# Patient Record
Sex: FEMALE | Race: ASIAN | URBAN - METROPOLITAN AREA
[De-identification: names, ages, dates, MRNs, and addresses within clinical notes are randomized per-mention and may not be internally consistent; named-entity substitution may affect disease eponyms.]

---

## 2018-03-23 ENCOUNTER — OUTPATIENT (OUTPATIENT)
Dept: OUTPATIENT SERVICES | Facility: HOSPITAL | Age: 78
LOS: 1 days | Discharge: HOME | End: 2018-03-23

## 2018-03-23 DIAGNOSIS — M79.672 PAIN IN LEFT FOOT: ICD-10-CM

## 2021-12-23 ENCOUNTER — OUTPATIENT (OUTPATIENT)
Dept: OUTPATIENT SERVICES | Facility: HOSPITAL | Age: 81
LOS: 1 days | Discharge: HOME | End: 2021-12-23

## 2021-12-23 VITALS
DIASTOLIC BLOOD PRESSURE: 77 MMHG | TEMPERATURE: 97 F | OXYGEN SATURATION: 98 % | RESPIRATION RATE: 17 BRPM | HEART RATE: 77 BPM | SYSTOLIC BLOOD PRESSURE: 171 MMHG | WEIGHT: 175.05 LBS | HEIGHT: 61 IN

## 2021-12-23 VITALS — DIASTOLIC BLOOD PRESSURE: 60 MMHG | SYSTOLIC BLOOD PRESSURE: 139 MMHG | RESPIRATION RATE: 17 BRPM | HEART RATE: 65 BPM

## 2021-12-23 DIAGNOSIS — Z98.890 OTHER SPECIFIED POSTPROCEDURAL STATES: Chronic | ICD-10-CM

## 2021-12-23 DIAGNOSIS — Z90.710 ACQUIRED ABSENCE OF BOTH CERVIX AND UTERUS: Chronic | ICD-10-CM

## 2021-12-23 NOTE — ASU PATIENT PROFILE, ADULT - FALL HARM RISK - UNIVERSAL INTERVENTIONS
Bed in lowest position, wheels locked, appropriate side rails in place/Call bell, personal items and telephone in reach/Instruct patient to call for assistance before getting out of bed or chair/Non-slip footwear when patient is out of bed/[place] to call system/Physically safe environment - no spills, clutter or unnecessary equipment/Purposeful Proactive Rounding/Room/bathroom lighting operational, light cord in reach

## 2021-12-23 NOTE — ASU PREOP CHECKLIST - TEMPERATURE IN CELSIUS (DEGREES C)
Brenda Caban is doing great. Keep up the good work! Here are just a few things to remember:    1. Discipline with positive distractions and/or brief time-outs. No hitting or spanking. Make sure to praise for good behavior.  2. Have and keep a nightly bedtime routine. Brush teeth every night before bed as well as in the morning using a smear of fluoride-containing toothpaste on a soft toothbrush.  3. Continue using a rear-facing car seat. Never put the car seat in the front seat.  4. Transition from formula/breast milk to whole milk. Encourage self-feeding. Provide 3 meals and 2-3 nutritious snacks a day.  5. Keep the home safe - use glover for stairs, lock up cleaning products and medicine, keep home water temperature <120 F, and keep small objects away from the baby.    Please make an appointment with me for the 15-month visit. Feel free to call us before your next appointment if you have any questions or concerns.    
36

## 2021-12-23 NOTE — ASU PREOP CHECKLIST - HAND OFF

## 2021-12-28 DIAGNOSIS — H25.89 OTHER AGE-RELATED CATARACT: ICD-10-CM

## 2021-12-28 DIAGNOSIS — I10 ESSENTIAL (PRIMARY) HYPERTENSION: ICD-10-CM

## 2021-12-28 DIAGNOSIS — Z88.0 ALLERGY STATUS TO PENICILLIN: ICD-10-CM

## 2021-12-28 DIAGNOSIS — Z88.8 ALLERGY STATUS TO OTHER DRUGS, MEDICAMENTS AND BIOLOGICAL SUBSTANCES STATUS: ICD-10-CM

## 2021-12-28 DIAGNOSIS — Z88.1 ALLERGY STATUS TO OTHER ANTIBIOTIC AGENTS STATUS: ICD-10-CM

## 2021-12-30 PROBLEM — I10 ESSENTIAL (PRIMARY) HYPERTENSION: Chronic | Status: ACTIVE | Noted: 2021-12-23

## 2022-01-06 ENCOUNTER — OUTPATIENT (OUTPATIENT)
Dept: OUTPATIENT SERVICES | Facility: HOSPITAL | Age: 82
LOS: 1 days | Discharge: HOME | End: 2022-01-06

## 2022-01-06 VITALS
HEART RATE: 68 BPM | WEIGHT: 175.05 LBS | DIASTOLIC BLOOD PRESSURE: 66 MMHG | RESPIRATION RATE: 17 BRPM | SYSTOLIC BLOOD PRESSURE: 154 MMHG | HEIGHT: 62 IN | OXYGEN SATURATION: 99 % | TEMPERATURE: 98 F

## 2022-01-06 VITALS — SYSTOLIC BLOOD PRESSURE: 139 MMHG | HEART RATE: 59 BPM | RESPIRATION RATE: 16 BRPM | DIASTOLIC BLOOD PRESSURE: 48 MMHG

## 2022-01-06 DIAGNOSIS — Z90.710 ACQUIRED ABSENCE OF BOTH CERVIX AND UTERUS: Chronic | ICD-10-CM

## 2022-01-06 DIAGNOSIS — Z90.89 ACQUIRED ABSENCE OF OTHER ORGANS: Chronic | ICD-10-CM

## 2022-01-06 DIAGNOSIS — Z98.890 OTHER SPECIFIED POSTPROCEDURAL STATES: Chronic | ICD-10-CM

## 2022-01-06 DIAGNOSIS — Z96.1 PRESENCE OF INTRAOCULAR LENS: Chronic | ICD-10-CM

## 2022-01-06 NOTE — ASU PATIENT PROFILE, ADULT - FALL HARM RISK - HARM RISK INTERVENTIONS

## 2022-01-06 NOTE — ASU PATIENT PROFILE, ADULT - NSICDXPASTSURGICALHX_GEN_ALL_CORE_FT
PAST SURGICAL HISTORY:  History of intraocular lens implant RIGHT    S/P bladder repair     S/P colonoscopy 2005    S/P hysterectomy     S/P tonsillectomy and adenoidectomy

## 2022-01-10 DIAGNOSIS — H26.8 OTHER SPECIFIED CATARACT: ICD-10-CM

## 2022-01-10 DIAGNOSIS — Z88.1 ALLERGY STATUS TO OTHER ANTIBIOTIC AGENTS STATUS: ICD-10-CM

## 2022-01-10 DIAGNOSIS — Z88.0 ALLERGY STATUS TO PENICILLIN: ICD-10-CM

## 2022-06-13 PROBLEM — E03.9 HYPOTHYROIDISM, UNSPECIFIED: Chronic | Status: ACTIVE | Noted: 2022-01-06

## 2022-06-13 PROBLEM — H26.9 UNSPECIFIED CATARACT: Chronic | Status: ACTIVE | Noted: 2022-01-06

## 2022-08-23 ENCOUNTER — APPOINTMENT (OUTPATIENT)
Dept: ORTHOPEDIC SURGERY | Facility: CLINIC | Age: 82
End: 2022-08-23

## 2022-08-23 DIAGNOSIS — M17.0 BILATERAL PRIMARY OSTEOARTHRITIS OF KNEE: ICD-10-CM

## 2022-08-23 PROBLEM — Z00.00 ENCOUNTER FOR PREVENTIVE HEALTH EXAMINATION: Status: ACTIVE | Noted: 2022-08-23

## 2022-08-23 PROCEDURE — 73560 X-RAY EXAM OF KNEE 1 OR 2: CPT | Mod: 50

## 2022-08-23 PROCEDURE — 99214 OFFICE O/P EST MOD 30 MIN: CPT

## 2022-08-23 NOTE — HISTORY OF PRESENT ILLNESS
[de-identified] : R KNEE PAIN WILL SCHEDULE FOR R TKA\par \par History: RIGHT KNEE PAIN, INJECTIONS NO LONGER RELIEVING SYMPTOMS FROM OA\par Patient has failed a course of nonoperative management including rest, activity modification, weight control, stretching program, therapeutic exercises, anti-inflammatory medication and angalgesics.\par \par Medical History: Past Medical History is unchanged, all medical issues and medications are stable.\par \par Review of systems is negative for fevers, chills, chest pain, shortness of breath, unexplained weight fluctuations, GI or  symptoms.\par \par Smoking history and social habits are unchanged.\par \par \par Exam:\par RIGHT KNEE: There is an effusion, clinically varus alignment, tenderness around the joint line, and a positive patella grind test.  ROM is 3-110 degrees and there is significant guarding throughout the arc of motion.  Mild quadriceps atrophy. Varus thrust is present with stress, no gross is instability noted.\par \par Imaging:\par Imaging:\par X-rays were taken in the office today.  \par AP and Lateral views were obtained of the knees.\par X-rays are negative for acute bone or soft tissue trauma.\par SEVERE ARTHRITIS NOTED IN BILATERAL KNEES\par \par Impression: BILATERAL KNEE OA WITH SYMPTOMS MORE SEVERE ON THE RIGHT THAN LEFT\par \par Plan: We discussed the surgery, including a description of the surgery in layman's terms, preoperative evaluation, the hospital stay, anesthesia, and expected outcome.  \par Models equivalent to the actual knee replacement prosthesis were used to demonstrate the magnitude and scope of the surgery.  Various modes of implant fixation including cement and biologic fixation were described.  The patient shared in the decision making and agreed to the use of implants.\par \par Additionally surgical risks were discussed. The patient has a good understanding of the inherent risks of surgery which include bleeding, pain, and infection, blood clots, and any medical issues that may arise in the perioperative period.  Additionally, we discussed risks uniquely pertinent to knee replacement surgery, including arthrofibrosis, instability, loosening, numbness around the incision, nerve injury, and possible need for revision surgery should the replacement not function optimally.  All questions were answered and the patient verbalized understanding.  I encouraged the patient to contact me should any further questions or issues arise.\par \par

## 2022-09-27 ENCOUNTER — RESULT REVIEW (OUTPATIENT)
Age: 82
End: 2022-09-27

## 2022-09-27 ENCOUNTER — OUTPATIENT (OUTPATIENT)
Dept: OUTPATIENT SERVICES | Facility: HOSPITAL | Age: 82
LOS: 1 days | Discharge: HOME | End: 2022-09-27

## 2022-09-27 VITALS
WEIGHT: 189.6 LBS | DIASTOLIC BLOOD PRESSURE: 72 MMHG | HEIGHT: 61 IN | TEMPERATURE: 97 F | OXYGEN SATURATION: 100 % | SYSTOLIC BLOOD PRESSURE: 168 MMHG | RESPIRATION RATE: 18 BRPM | HEART RATE: 64 BPM

## 2022-09-27 DIAGNOSIS — Z90.89 ACQUIRED ABSENCE OF OTHER ORGANS: Chronic | ICD-10-CM

## 2022-09-27 DIAGNOSIS — Z98.890 OTHER SPECIFIED POSTPROCEDURAL STATES: Chronic | ICD-10-CM

## 2022-09-27 DIAGNOSIS — J34.2 DEVIATED NASAL SEPTUM: Chronic | ICD-10-CM

## 2022-09-27 DIAGNOSIS — M17.11 UNILATERAL PRIMARY OSTEOARTHRITIS, RIGHT KNEE: ICD-10-CM

## 2022-09-27 DIAGNOSIS — Z01.818 ENCOUNTER FOR OTHER PREPROCEDURAL EXAMINATION: ICD-10-CM

## 2022-09-27 DIAGNOSIS — Z96.1 PRESENCE OF INTRAOCULAR LENS: Chronic | ICD-10-CM

## 2022-09-27 DIAGNOSIS — Z90.710 ACQUIRED ABSENCE OF BOTH CERVIX AND UTERUS: Chronic | ICD-10-CM

## 2022-09-27 LAB
A1C WITH ESTIMATED AVERAGE GLUCOSE RESULT: 5.9 % — HIGH (ref 4–5.6)
ALBUMIN SERPL ELPH-MCNC: 4.5 G/DL — SIGNIFICANT CHANGE UP (ref 3.5–5.2)
ALP SERPL-CCNC: 90 U/L — SIGNIFICANT CHANGE UP (ref 30–115)
ALT FLD-CCNC: 28 U/L — SIGNIFICANT CHANGE UP (ref 0–41)
ANION GAP SERPL CALC-SCNC: 13 MMOL/L — SIGNIFICANT CHANGE UP (ref 7–14)
APTT BLD: 28.9 SEC — SIGNIFICANT CHANGE UP (ref 27–39.2)
AST SERPL-CCNC: 24 U/L — SIGNIFICANT CHANGE UP (ref 0–41)
BASOPHILS # BLD AUTO: 0.04 K/UL — SIGNIFICANT CHANGE UP (ref 0–0.2)
BASOPHILS NFR BLD AUTO: 0.7 % — SIGNIFICANT CHANGE UP (ref 0–1)
BILIRUB SERPL-MCNC: 0.8 MG/DL — SIGNIFICANT CHANGE UP (ref 0.2–1.2)
BLD GP AB SCN SERPL QL: SIGNIFICANT CHANGE UP
BUN SERPL-MCNC: 26 MG/DL — HIGH (ref 10–20)
CALCIUM SERPL-MCNC: 9.9 MG/DL — SIGNIFICANT CHANGE UP (ref 8.4–10.5)
CHLORIDE SERPL-SCNC: 104 MMOL/L — SIGNIFICANT CHANGE UP (ref 98–110)
CO2 SERPL-SCNC: 25 MMOL/L — SIGNIFICANT CHANGE UP (ref 17–32)
CREAT SERPL-MCNC: 1 MG/DL — SIGNIFICANT CHANGE UP (ref 0.7–1.5)
EGFR: 57 ML/MIN/1.73M2 — LOW
EOSINOPHIL # BLD AUTO: 0.4 K/UL — SIGNIFICANT CHANGE UP (ref 0–0.7)
EOSINOPHIL NFR BLD AUTO: 6.7 % — SIGNIFICANT CHANGE UP (ref 0–8)
ESTIMATED AVERAGE GLUCOSE: 123 MG/DL — HIGH (ref 68–114)
GLUCOSE SERPL-MCNC: 95 MG/DL — SIGNIFICANT CHANGE UP (ref 70–99)
HCT VFR BLD CALC: 40.6 % — SIGNIFICANT CHANGE UP (ref 37–47)
HGB BLD-MCNC: 13 G/DL — SIGNIFICANT CHANGE UP (ref 12–16)
IMM GRANULOCYTES NFR BLD AUTO: 0.3 % — SIGNIFICANT CHANGE UP (ref 0.1–0.3)
INR BLD: 0.96 RATIO — SIGNIFICANT CHANGE UP (ref 0.65–1.3)
LYMPHOCYTES # BLD AUTO: 1.89 K/UL — SIGNIFICANT CHANGE UP (ref 1.2–3.4)
LYMPHOCYTES # BLD AUTO: 31.6 % — SIGNIFICANT CHANGE UP (ref 20.5–51.1)
MCHC RBC-ENTMCNC: 30.6 PG — SIGNIFICANT CHANGE UP (ref 27–31)
MCHC RBC-ENTMCNC: 32 G/DL — SIGNIFICANT CHANGE UP (ref 32–37)
MCV RBC AUTO: 95.5 FL — SIGNIFICANT CHANGE UP (ref 81–99)
MONOCYTES # BLD AUTO: 0.58 K/UL — SIGNIFICANT CHANGE UP (ref 0.1–0.6)
MONOCYTES NFR BLD AUTO: 9.7 % — HIGH (ref 1.7–9.3)
MRSA PCR RESULT.: NEGATIVE — SIGNIFICANT CHANGE UP
NEUTROPHILS # BLD AUTO: 3.06 K/UL — SIGNIFICANT CHANGE UP (ref 1.4–6.5)
NEUTROPHILS NFR BLD AUTO: 51 % — SIGNIFICANT CHANGE UP (ref 42.2–75.2)
NRBC # BLD: 0 /100 WBCS — SIGNIFICANT CHANGE UP (ref 0–0)
PLATELET # BLD AUTO: 176 K/UL — SIGNIFICANT CHANGE UP (ref 130–400)
POTASSIUM SERPL-MCNC: 4.3 MMOL/L — SIGNIFICANT CHANGE UP (ref 3.5–5)
POTASSIUM SERPL-SCNC: 4.3 MMOL/L — SIGNIFICANT CHANGE UP (ref 3.5–5)
PROT SERPL-MCNC: 6.6 G/DL — SIGNIFICANT CHANGE UP (ref 6–8)
PROTHROM AB SERPL-ACNC: 11.1 SEC — SIGNIFICANT CHANGE UP (ref 9.95–12.87)
RBC # BLD: 4.25 M/UL — SIGNIFICANT CHANGE UP (ref 4.2–5.4)
RBC # FLD: 13.3 % — SIGNIFICANT CHANGE UP (ref 11.5–14.5)
SODIUM SERPL-SCNC: 142 MMOL/L — SIGNIFICANT CHANGE UP (ref 135–146)
WBC # BLD: 5.99 K/UL — SIGNIFICANT CHANGE UP (ref 4.8–10.8)
WBC # FLD AUTO: 5.99 K/UL — SIGNIFICANT CHANGE UP (ref 4.8–10.8)

## 2022-09-27 PROCEDURE — 72170 X-RAY EXAM OF PELVIS: CPT | Mod: 26

## 2022-09-27 PROCEDURE — 93010 ELECTROCARDIOGRAM REPORT: CPT

## 2022-09-27 PROCEDURE — 71046 X-RAY EXAM CHEST 2 VIEWS: CPT | Mod: 26

## 2022-09-27 PROCEDURE — 73562 X-RAY EXAM OF KNEE 3: CPT | Mod: 26,RT

## 2022-09-27 RX ORDER — TERAZOSIN HYDROCHLORIDE 10 MG/1
0 CAPSULE ORAL
Qty: 0 | Refills: 0 | DISCHARGE

## 2022-09-27 NOTE — H&P PST ADULT - NSICDXPASTMEDICALHX_GEN_ALL_CORE_FT
PAST MEDICAL HISTORY:  Bilateral cataracts     [place] (hard of hearing)     Hypertension     Hypothyroidism     Obesity BMI 35    Overactive bladder

## 2022-09-27 NOTE — H&P PST ADULT - HISTORY OF PRESENT ILLNESS
Pt denies cp palp uri cough dysuria    ET 1 FOS - difficult  to assess regarding pain in knee- but admits to SOB with 1 FOS -   PATIENT DENIES WALKING 1 FLAT BLOCKue to pain but  PATIENT DENIES SOB WITH WALKING A SUPERMARKET OR STORE 1 HOUR-WALKING SLOWLY.DENIES SOB     PT denies any open wounds, drainage or rashes.   denies recent cough fever or infection. aware to self quaerantine preop. all instructions reviewed.    scheduled fotr 10/14 right TKR . hx r knee OA. right knee increasing pain x 5 years. right knee pain 10/10 while walking  pain decrease with rest.. increases with motion and movement. DENIES PAIN MEDS  hx jehovah witness.   will refuse transfusion but accept alternatives in emergency  follows dr stokes endocrine. denies any recent  dose changes with levothyroxine.     Patient verbalized understanding of instructions and was given the opportunity to ask questions and have them answered.    Patient denies any signs or symptoms of COVID 19 and denies contact with known positive individuals.  They have an appointment for repeat COVID testing pre-procedure and acknowledge its time and place.  They were instructed to quarantine pre-procedure, practice exposure control measures, continue to self-monitor and report any concerns to their proceduralist    Anesthesia Alert  yes--Difficult Airway  IV  NO--History of neck surgery or radiation  NO--Limited ROM of neck  NO--History of Malignant hyperthermia  NO--Personal or family history of Pseudocholinesterase deficiency.  YES--Prior Anesthesia Complication   n/v  NO--Latex Allergy  NO--Loose teeth  NO--History of Rheumatoid Arthritis  unknown --NAOMIE   admits to snoring- denies work up  NO--Bleeding risk  yes--Other_____  Jehovah witness-

## 2022-09-27 NOTE — H&P PST ADULT - NSICDXPASTSURGICALHX_GEN_ALL_CORE_FT
PAST SURGICAL HISTORY:  Deviated septum 1980s    History of intraocular lens implant RIGHT    S/P bladder repair     S/P colonoscopy 2005    S/P hysterectomy     S/P tonsillectomy and adenoidectomy

## 2022-10-11 ENCOUNTER — LABORATORY RESULT (OUTPATIENT)
Age: 82
End: 2022-10-11

## 2022-10-13 ENCOUNTER — FORM ENCOUNTER (OUTPATIENT)
Age: 82
End: 2022-10-13

## 2022-10-14 ENCOUNTER — TRANSCRIPTION ENCOUNTER (OUTPATIENT)
Age: 82
End: 2022-10-14

## 2022-10-14 ENCOUNTER — APPOINTMENT (OUTPATIENT)
Dept: ORTHOPEDIC SURGERY | Facility: HOSPITAL | Age: 82
End: 2022-10-14

## 2022-10-14 ENCOUNTER — RESULT REVIEW (OUTPATIENT)
Age: 82
End: 2022-10-14

## 2022-10-14 ENCOUNTER — INPATIENT (INPATIENT)
Facility: HOSPITAL | Age: 82
LOS: 0 days | Discharge: HOME HEALTH | End: 2022-10-15
Attending: ORTHOPAEDIC SURGERY | Admitting: ORTHOPAEDIC SURGERY

## 2022-10-14 VITALS
WEIGHT: 184.97 LBS | DIASTOLIC BLOOD PRESSURE: 68 MMHG | HEIGHT: 61 IN | SYSTOLIC BLOOD PRESSURE: 158 MMHG | RESPIRATION RATE: 17 BRPM | TEMPERATURE: 97 F | HEART RATE: 69 BPM | OXYGEN SATURATION: 99 %

## 2022-10-14 DIAGNOSIS — Z90.89 ACQUIRED ABSENCE OF OTHER ORGANS: Chronic | ICD-10-CM

## 2022-10-14 DIAGNOSIS — Z96.1 PRESENCE OF INTRAOCULAR LENS: Chronic | ICD-10-CM

## 2022-10-14 DIAGNOSIS — Z90.710 ACQUIRED ABSENCE OF BOTH CERVIX AND UTERUS: Chronic | ICD-10-CM

## 2022-10-14 DIAGNOSIS — J34.2 DEVIATED NASAL SEPTUM: Chronic | ICD-10-CM

## 2022-10-14 DIAGNOSIS — Z98.890 OTHER SPECIFIED POSTPROCEDURAL STATES: Chronic | ICD-10-CM

## 2022-10-14 DIAGNOSIS — Z98.49 CATARACT EXTRACTION STATUS, UNSPECIFIED EYE: Chronic | ICD-10-CM

## 2022-10-14 LAB
GLUCOSE BLDC GLUCOMTR-MCNC: 108 MG/DL — HIGH (ref 70–99)
GLUCOSE BLDC GLUCOMTR-MCNC: 89 MG/DL — SIGNIFICANT CHANGE UP (ref 70–99)

## 2022-10-14 PROCEDURE — 27447 TOTAL KNEE ARTHROPLASTY: CPT | Mod: RT

## 2022-10-14 PROCEDURE — 88305 TISSUE EXAM BY PATHOLOGIST: CPT | Mod: 26

## 2022-10-14 PROCEDURE — 88311 DECALCIFY TISSUE: CPT | Mod: 26

## 2022-10-14 PROCEDURE — 73560 X-RAY EXAM OF KNEE 1 OR 2: CPT | Mod: 26,RT

## 2022-10-14 RX ORDER — ACETAMINOPHEN 500 MG
1000 TABLET ORAL ONCE
Refills: 0 | Status: COMPLETED | OUTPATIENT
Start: 2022-10-14 | End: 2022-10-14

## 2022-10-14 RX ORDER — MAGNESIUM HYDROXIDE 400 MG/1
30 TABLET, CHEWABLE ORAL DAILY
Refills: 0 | Status: DISCONTINUED | OUTPATIENT
Start: 2022-10-14 | End: 2022-10-15

## 2022-10-14 RX ORDER — SENNA PLUS 8.6 MG/1
2 TABLET ORAL AT BEDTIME
Refills: 0 | Status: DISCONTINUED | OUTPATIENT
Start: 2022-10-14 | End: 2022-10-15

## 2022-10-14 RX ORDER — PANTOPRAZOLE SODIUM 20 MG/1
1 TABLET, DELAYED RELEASE ORAL
Qty: 30 | Refills: 0
Start: 2022-10-14 | End: 2022-11-12

## 2022-10-14 RX ORDER — ASPIRIN/CALCIUM CARB/MAGNESIUM 324 MG
81 TABLET ORAL EVERY 12 HOURS
Refills: 0 | Status: DISCONTINUED | OUTPATIENT
Start: 2022-10-14 | End: 2022-10-15

## 2022-10-14 RX ORDER — CEFAZOLIN SODIUM 1 G
2000 VIAL (EA) INJECTION EVERY 8 HOURS
Refills: 0 | Status: COMPLETED | OUTPATIENT
Start: 2022-10-14 | End: 2022-10-15

## 2022-10-14 RX ORDER — CHLORHEXIDINE GLUCONATE 213 G/1000ML
1 SOLUTION TOPICAL
Refills: 0 | Status: DISCONTINUED | OUTPATIENT
Start: 2022-10-14 | End: 2022-10-15

## 2022-10-14 RX ORDER — OXYCODONE HYDROCHLORIDE 5 MG/1
1 TABLET ORAL
Qty: 9 | Refills: 0
Start: 2022-10-14 | End: 2022-10-16

## 2022-10-14 RX ORDER — LEVOTHYROXINE SODIUM 125 MCG
75 TABLET ORAL DAILY
Refills: 0 | Status: DISCONTINUED | OUTPATIENT
Start: 2022-10-14 | End: 2022-10-15

## 2022-10-14 RX ORDER — DEXAMETHASONE 0.5 MG/5ML
2 ELIXIR ORAL ONCE
Refills: 0 | Status: COMPLETED | OUTPATIENT
Start: 2022-10-15 | End: 2022-10-15

## 2022-10-14 RX ORDER — SODIUM CHLORIDE 9 MG/ML
1000 INJECTION, SOLUTION INTRAVENOUS
Refills: 0 | Status: DISCONTINUED | OUTPATIENT
Start: 2022-10-14 | End: 2022-10-14

## 2022-10-14 RX ORDER — AMLODIPINE BESYLATE 2.5 MG/1
10 TABLET ORAL DAILY
Refills: 0 | Status: DISCONTINUED | OUTPATIENT
Start: 2022-10-14 | End: 2022-10-15

## 2022-10-14 RX ORDER — ONDANSETRON 8 MG/1
4 TABLET, FILM COATED ORAL EVERY 6 HOURS
Refills: 0 | Status: DISCONTINUED | OUTPATIENT
Start: 2022-10-14 | End: 2022-10-15

## 2022-10-14 RX ORDER — LEVOTHYROXINE SODIUM 125 MCG
1 TABLET ORAL
Qty: 0 | Refills: 0 | DISCHARGE

## 2022-10-14 RX ORDER — ASPIRIN/CALCIUM CARB/MAGNESIUM 324 MG
1 TABLET ORAL
Qty: 60 | Refills: 0
Start: 2022-10-14 | End: 2022-11-12

## 2022-10-14 RX ORDER — ACETAMINOPHEN 500 MG
650 TABLET ORAL EVERY 6 HOURS
Refills: 0 | Status: DISCONTINUED | OUTPATIENT
Start: 2022-10-14 | End: 2022-10-15

## 2022-10-14 RX ORDER — SENNA PLUS 8.6 MG/1
2 TABLET ORAL
Qty: 30 | Refills: 0
Start: 2022-10-14 | End: 2022-10-28

## 2022-10-14 RX ORDER — TRAMADOL HYDROCHLORIDE 50 MG/1
1 TABLET ORAL
Qty: 24 | Refills: 0
Start: 2022-10-14 | End: 2022-10-19

## 2022-10-14 RX ORDER — SODIUM CHLORIDE 9 MG/ML
1000 INJECTION INTRAMUSCULAR; INTRAVENOUS; SUBCUTANEOUS
Refills: 0 | Status: DISCONTINUED | OUTPATIENT
Start: 2022-10-14 | End: 2022-10-15

## 2022-10-14 RX ORDER — HYDROMORPHONE HYDROCHLORIDE 2 MG/ML
0.5 INJECTION INTRAMUSCULAR; INTRAVENOUS; SUBCUTANEOUS
Refills: 0 | Status: DISCONTINUED | OUTPATIENT
Start: 2022-10-14 | End: 2022-10-14

## 2022-10-14 RX ORDER — MORPHINE SULFATE 50 MG/1
2 CAPSULE, EXTENDED RELEASE ORAL
Refills: 0 | Status: DISCONTINUED | OUTPATIENT
Start: 2022-10-14 | End: 2022-10-14

## 2022-10-14 RX ORDER — AMLODIPINE BESYLATE 2.5 MG/1
1 TABLET ORAL
Qty: 0 | Refills: 0 | DISCHARGE

## 2022-10-14 RX ORDER — TRAMADOL HYDROCHLORIDE 50 MG/1
50 TABLET ORAL EVERY 4 HOURS
Refills: 0 | Status: DISCONTINUED | OUTPATIENT
Start: 2022-10-14 | End: 2022-10-15

## 2022-10-14 RX ORDER — OXYCODONE HYDROCHLORIDE 5 MG/1
5 TABLET ORAL EVERY 4 HOURS
Refills: 0 | Status: DISCONTINUED | OUTPATIENT
Start: 2022-10-14 | End: 2022-10-15

## 2022-10-14 RX ORDER — ACETAMINOPHEN 500 MG
2 TABLET ORAL
Qty: 96 | Refills: 0
Start: 2022-10-14 | End: 2022-10-25

## 2022-10-14 RX ORDER — PANTOPRAZOLE SODIUM 20 MG/1
40 TABLET, DELAYED RELEASE ORAL
Refills: 0 | Status: DISCONTINUED | OUTPATIENT
Start: 2022-10-14 | End: 2022-10-15

## 2022-10-14 RX ORDER — POLYETHYLENE GLYCOL 3350 17 G/17G
17 POWDER, FOR SOLUTION ORAL AT BEDTIME
Refills: 0 | Status: DISCONTINUED | OUTPATIENT
Start: 2022-10-14 | End: 2022-10-15

## 2022-10-14 RX ORDER — ONDANSETRON 8 MG/1
4 TABLET, FILM COATED ORAL ONCE
Refills: 0 | Status: DISCONTINUED | OUTPATIENT
Start: 2022-10-14 | End: 2022-10-14

## 2022-10-14 RX ORDER — TERAZOSIN HYDROCHLORIDE 10 MG/1
0 CAPSULE ORAL
Qty: 0 | Refills: 0 | DISCHARGE

## 2022-10-14 RX ADMIN — HYDROMORPHONE HYDROCHLORIDE 0.5 MILLIGRAM(S): 2 INJECTION INTRAMUSCULAR; INTRAVENOUS; SUBCUTANEOUS at 16:30

## 2022-10-14 RX ADMIN — Medication 81 MILLIGRAM(S): at 21:40

## 2022-10-14 RX ADMIN — Medication 650 MILLIGRAM(S): at 17:41

## 2022-10-14 RX ADMIN — HYDROMORPHONE HYDROCHLORIDE 0.5 MILLIGRAM(S): 2 INJECTION INTRAMUSCULAR; INTRAVENOUS; SUBCUTANEOUS at 16:47

## 2022-10-14 RX ADMIN — Medication 1000 MILLIGRAM(S): at 11:11

## 2022-10-14 RX ADMIN — Medication 100 MILLIGRAM(S): at 21:38

## 2022-10-14 RX ADMIN — POLYETHYLENE GLYCOL 3350 17 GRAM(S): 17 POWDER, FOR SOLUTION ORAL at 21:40

## 2022-10-14 RX ADMIN — HYDROMORPHONE HYDROCHLORIDE 0.5 MILLIGRAM(S): 2 INJECTION INTRAMUSCULAR; INTRAVENOUS; SUBCUTANEOUS at 17:08

## 2022-10-14 RX ADMIN — SENNA PLUS 2 TABLET(S): 8.6 TABLET ORAL at 21:41

## 2022-10-14 RX ADMIN — Medication 650 MILLIGRAM(S): at 23:51

## 2022-10-14 RX ADMIN — SODIUM CHLORIDE 75 MILLILITER(S): 9 INJECTION, SOLUTION INTRAVENOUS at 16:31

## 2022-10-14 RX ADMIN — HYDROMORPHONE HYDROCHLORIDE 0.5 MILLIGRAM(S): 2 INJECTION INTRAMUSCULAR; INTRAVENOUS; SUBCUTANEOUS at 17:29

## 2022-10-14 NOTE — DISCHARGE NOTE PROVIDER - NSDCCPCAREPLAN_GEN_ALL_CORE_FT
PRINCIPAL DISCHARGE DIAGNOSIS  Diagnosis: Status post total right knee replacement  Assessment and Plan of Treatment:

## 2022-10-14 NOTE — PROGRESS NOTE ADULT - SUBJECTIVE AND OBJECTIVE BOX
82 y o F s/p right tka pod 0    MEDICATIONS  (STANDING):  acetaminophen     Tablet .. 650 milliGRAM(s) Oral every 6 hours  amLODIPine   Tablet 10 milliGRAM(s) Oral daily  aspirin enteric coated 81 milliGRAM(s) Oral every 12 hours  ceFAZolin   IVPB 2000 milliGRAM(s) IV Intermittent every 8 hours  chlorhexidine 2% Cloths 1 Application(s) Topical <User Schedule>  lactated ringers. 1000 milliLiter(s) (75 mL/Hr) IV Continuous <Continuous>  levothyroxine 75 MICROGram(s) Oral daily  multivitamin 1 Tablet(s) Oral daily  pantoprazole    Tablet 40 milliGRAM(s) Oral before breakfast  polyethylene glycol 3350 17 Gram(s) Oral at bedtime  senna 2 Tablet(s) Oral at bedtime  sodium chloride 0.9%. 1000 milliLiter(s) (75 mL/Hr) IV Continuous <Continuous>    MEDICATIONS  (PRN):  aluminum hydroxide/magnesium hydroxide/simethicone Suspension 30 milliLiter(s) Oral four times a day PRN Indigestion  magnesium hydroxide Suspension 30 milliLiter(s) Oral daily PRN Constipation  morphine  - Injectable 2 milliGRAM(s) IV Push every 10 minutes PRN Moderate Pain (4 - 6)  ondansetron Injectable 4 milliGRAM(s) IV Push every 6 hours PRN Nausea and/or Vomiting  ondansetron Injectable 4 milliGRAM(s) IV Push once PRN Nausea and/or Vomiting  oxyCODONE    IR 5 milliGRAM(s) Oral every 4 hours PRN breakthrough pain  traMADol 50 milliGRAM(s) Oral every 4 hours PRN Moderate Pain (4 - 6)      Pt s/e at bedside, comfortable in bed, c/o RLE pain, denies CP, SOB, N/V, dizziness. Just transferred from , did not void yet.    NAD    Vital Signs Last 24 Hrs  T(C): 36.1 (14 Oct 2022 18:24), Max: 36.3 (14 Oct 2022 11:33)  T(F): 97 (14 Oct 2022 18:24), Max: 97.4 (14 Oct 2022 11:33)  HR: 57 (14 Oct 2022 18:24) (53 - 69)  BP: 134/64 (14 Oct 2022 18:24) (132/60 - 158/68)  BP(mean): --  RR: 18 (14 Oct 2022 18:24) (10 - 18)  SpO2: 94% (14 Oct 2022 18:00) (94% - 100%)    Parameters below as of 14 Oct 2022 18:00  Patient On (Oxygen Delivery Method): nasal cannula    PE:  dressing d/c/i  compartments soft  NVID  calf soft, NT  foot wwp    PT/OT, wbat  pain control  DVT ppx  GI ppx  void check  postop abx  am labs  home meds  diet  IS  discharge planning

## 2022-10-14 NOTE — DISCHARGE NOTE PROVIDER - HOSPITAL COURSE
Pt underwent right total knee replacement on 10/14/22, pt tolerated procedure well. Pt was given intraop and postop abx for infection ppx, pain control meds, DVT/GI ppx. Pt worked with PT/OT in hospital, cleared for discharge home with home care services

## 2022-10-14 NOTE — DISCHARGE NOTE PROVIDER - NSDCMRMEDTOKEN_GEN_ALL_CORE_FT
acetaminophen 325 mg oral tablet: 2 tab(s) orally every 6 hours MDD:8  amLODIPine 10 mg oral tablet: 1 tab(s) orally once a day  aspirin 81 mg oral delayed release tablet: 1 tab(s) orally every 12 hours MDD:2  oxyCODONE 5 mg oral tablet: 1 tab(s) orally every 8 hours, As Needed -breakthrough pain MDD:3  pantoprazole 40 mg oral delayed release tablet: 1 tab(s) orally once a day (before a meal) MDD:1  senna leaf extract oral tablet: 2 tab(s) orally once a day (at bedtime), As Needed -for constipation MDD:2   Synthroid 75 mcg (0.075 mg) oral tablet: 1 tab(s) orally once a day  terazosin: orally once a day  traMADol 50 mg oral tablet: 1 tab(s) orally every 6 hours, As Needed -severe pain MDD:4

## 2022-10-14 NOTE — PHYSICAL THERAPY INITIAL EVALUATION ADULT - LIVES WITH, PROFILE
Pt lives with daughter in a private home with 3 steps to enter with R handrail going up and a flight of stairs inside with wall on both sides/ no handrail./children

## 2022-10-14 NOTE — BRIEF OPERATIVE NOTE - NSICDXBRIEFPROCEDURE_GEN_ALL_CORE_FT
PROCEDURES:  Right total knee arthroplasty 14-Oct-2022 16:02:57  Avery Schaeffer  
complains of pain/discomfort

## 2022-10-14 NOTE — PATIENT PROFILE ADULT - FALL HARM RISK - HARM RISK INTERVENTIONS
Assistance with ambulation/Assistance OOB with selected safe patient handling equipment/Communicate Risk of Fall with Harm to all staff/Discuss with provider need for PT consult/Monitor gait and stability/Provide patient with walking aids - walker, cane, crutches/Reinforce activity limits and safety measures with patient and family/Sit up slowly, dangle for a short time, stand at bedside before walking/Tailored Fall Risk Interventions/Use of alarms - bed, chair and/or voice tab/Visual Cue: Yellow wristband and red socks/Bed in lowest position, wheels locked, appropriate side rails in place/Call bell, personal items and telephone in reach/Instruct patient to call for assistance before getting out of bed or chair/Non-slip footwear when patient is out of bed/[place] to call system/Physically safe environment - no spills, clutter or unnecessary equipment/Purposeful Proactive Rounding/Room/bathroom lighting operational, light cord in reach

## 2022-10-14 NOTE — PATIENT PROFILE ADULT - FALL HARM RISK - PATIENT NEEDS ASSISTANCE
CHEST 2 VIEWS 



INDICATION / CLINICAL INFORMATION:

asthma. Dyspnea





FINDINGS:



SUPPORT DEVICES: None.



HEART / MEDIASTINUM: No significant abnormality. 



LUNGS / PLEURA: No significant pulmonary or pleural abnormality. No pneumothorax. 



ADDITIONAL FINDINGS: No significant additional findings.



IMPRESSION:

1. No acute findings.



Signer Name: Jus Matson MD 

Signed: 6/2/2022 6:33 PM

Workstation Name: [employer]
Standing/Walking

## 2022-10-14 NOTE — DISCHARGE NOTE PROVIDER - CARE PROVIDER_API CALL
Daniel Frost)  Orthopaedic Surgery  [address] [place] [address]  Phone: [phone]  Fax: [phone]  Follow Up Time:

## 2022-10-14 NOTE — ASU PATIENT PROFILE, ADULT - FALL HARM RISK - HARM RISK INTERVENTIONS

## 2022-10-14 NOTE — ASU PATIENT PROFILE, ADULT - NSICDXPASTSURGICALHX_GEN_ALL_CORE_FT
PAST SURGICAL HISTORY:  Deviated septum 1980s    H/O cataract extraction Bilateral    History of intraocular lens implant RIGHT    S/P bladder repair     S/P colonoscopy 2005    S/P hysterectomy     S/P tonsillectomy and adenoidectomy

## 2022-10-14 NOTE — ASU PATIENT PROFILE, ADULT - NSICDXPASTMEDICALHX_GEN_ALL_CORE_FT
PAST MEDICAL HISTORY:  Bilateral cataracts     [place] (hard of hearing)     Hypertension     Hypothyroidism     Obesity BMI 35    Overactive bladder      Monthly or less

## 2022-10-14 NOTE — PHYSICAL THERAPY INITIAL EVALUATION ADULT - GENERAL OBSERVATIONS, REHAB EVAL
19:50-20:15. Chart reviewed; confirmed with RN to see the pt for PT. Pt ready for PT; received in bed with complain of some pain in R knee. +hep lock L UE, +ace bandage R knee. Agreeable for PT evaluation.

## 2022-10-14 NOTE — PHYSICAL THERAPY INITIAL EVALUATION ADULT - PERTINENT HX OF CURRENT PROBLEM, REHAB EVAL
Pt is an 83 y/o female with dx of elective R TKR with h/o R knee OA under regional anesthesia seen pod #0.

## 2022-10-14 NOTE — DISCHARGE NOTE PROVIDER - NSDCCPGOAL_GEN_ALL_CORE_FT
To get better and follow your care plan as instructed. PT, wbat, cont aspirin 81 mg bid x30 days for dvt ppx, cont protonix for GI ppx, keep postop dressing 1 week, may shower, f/u as OP with dr. Dickinson in 3 weeks

## 2022-10-14 NOTE — DISCHARGE NOTE PROVIDER - NSDCFUSCHEDAPPT_GEN_ALL_CORE_FT
Daniel Dickinson  [facility] Physician [facility]  [address]  Scheduled Appointment: 11/03/2022

## 2022-10-15 ENCOUNTER — FORM ENCOUNTER (OUTPATIENT)
Age: 82
End: 2022-10-15

## 2022-10-15 ENCOUNTER — TRANSCRIPTION ENCOUNTER (OUTPATIENT)
Age: 82
End: 2022-10-15

## 2022-10-15 VITALS — DIASTOLIC BLOOD PRESSURE: 64 MMHG | SYSTOLIC BLOOD PRESSURE: 147 MMHG | HEART RATE: 76 BPM | TEMPERATURE: 98 F

## 2022-10-15 LAB
ANION GAP SERPL CALC-SCNC: 9 MMOL/L — SIGNIFICANT CHANGE UP (ref 7–14)
BUN SERPL-MCNC: 20 MG/DL — SIGNIFICANT CHANGE UP (ref 10–20)
CALCIUM SERPL-MCNC: 9 MG/DL — SIGNIFICANT CHANGE UP (ref 8.4–10.5)
CHLORIDE SERPL-SCNC: 104 MMOL/L — SIGNIFICANT CHANGE UP (ref 98–110)
CO2 SERPL-SCNC: 26 MMOL/L — SIGNIFICANT CHANGE UP (ref 17–32)
CREAT SERPL-MCNC: 0.8 MG/DL — SIGNIFICANT CHANGE UP (ref 0.7–1.5)
EGFR: 74 ML/MIN/1.73M2 — SIGNIFICANT CHANGE UP
GLUCOSE SERPL-MCNC: 129 MG/DL — HIGH (ref 70–99)
HCT VFR BLD CALC: 37.1 % — SIGNIFICANT CHANGE UP (ref 37–47)
HGB BLD-MCNC: 12.2 G/DL — SIGNIFICANT CHANGE UP (ref 12–16)
MCHC RBC-ENTMCNC: 30.7 PG — SIGNIFICANT CHANGE UP (ref 27–31)
MCHC RBC-ENTMCNC: 32.9 G/DL — SIGNIFICANT CHANGE UP (ref 32–37)
MCV RBC AUTO: 93.5 FL — SIGNIFICANT CHANGE UP (ref 81–99)
NRBC # BLD: 0 /100 WBCS — SIGNIFICANT CHANGE UP (ref 0–0)
PLATELET # BLD AUTO: 183 K/UL — SIGNIFICANT CHANGE UP (ref 130–400)
POTASSIUM SERPL-MCNC: 4.4 MMOL/L — SIGNIFICANT CHANGE UP (ref 3.5–5)
POTASSIUM SERPL-SCNC: 4.4 MMOL/L — SIGNIFICANT CHANGE UP (ref 3.5–5)
RBC # BLD: 3.97 M/UL — LOW (ref 4.2–5.4)
RBC # FLD: 12.7 % — SIGNIFICANT CHANGE UP (ref 11.5–14.5)
SODIUM SERPL-SCNC: 139 MMOL/L — SIGNIFICANT CHANGE UP (ref 135–146)
WBC # BLD: 13.38 K/UL — HIGH (ref 4.8–10.8)
WBC # FLD AUTO: 13.38 K/UL — HIGH (ref 4.8–10.8)

## 2022-10-15 PROCEDURE — 99221 1ST HOSP IP/OBS SF/LOW 40: CPT

## 2022-10-15 RX ORDER — CELECOXIB 200 MG/1
1 CAPSULE ORAL
Qty: 28 | Refills: 0
Start: 2022-10-15 | End: 2022-10-28

## 2022-10-15 RX ADMIN — Medication 650 MILLIGRAM(S): at 06:38

## 2022-10-15 RX ADMIN — Medication 650 MILLIGRAM(S): at 00:00

## 2022-10-15 RX ADMIN — Medication 75 MICROGRAM(S): at 05:04

## 2022-10-15 RX ADMIN — PANTOPRAZOLE SODIUM 40 MILLIGRAM(S): 20 TABLET, DELAYED RELEASE ORAL at 05:02

## 2022-10-15 RX ADMIN — Medication 2 MILLIGRAM(S): at 11:52

## 2022-10-15 RX ADMIN — TRAMADOL HYDROCHLORIDE 50 MILLIGRAM(S): 50 TABLET ORAL at 09:17

## 2022-10-15 RX ADMIN — Medication 100 MILLIGRAM(S): at 05:02

## 2022-10-15 RX ADMIN — AMLODIPINE BESYLATE 10 MILLIGRAM(S): 2.5 TABLET ORAL at 05:03

## 2022-10-15 RX ADMIN — Medication 650 MILLIGRAM(S): at 05:02

## 2022-10-15 RX ADMIN — OXYCODONE HYDROCHLORIDE 5 MILLIGRAM(S): 5 TABLET ORAL at 11:51

## 2022-10-15 NOTE — CONSULT NOTE ADULT - SUBJECTIVE AND OBJECTIVE BOX
82 year old woman s/p orthopedic procedure.    Today:  Seen at bedside, denies chest pain, SOB, palpitations, lightheadedness, diaphoresis.        REVIEW OF SYSTEMS:  CONSTITUTIONAL: No fever, weight loss, or fatigue  EYES: No eye pain, visual disturbances, or discharge  ENMT:  No difficulty hearing, tinnitus, vertigo; No sinus or throat pain  NECK: No pain or stiffness  RESPIRATORY: No cough, wheezing, chills or hemoptysis; No shortness of breath  CARDIOVASCULAR: No chest pain, palpitations, dizziness, or leg swelling  GASTROINTESTINAL: No abdominal or epigastric pain. No nausea, vomiting, or hematemesis; No diarrhea or constipation. No melena or hematochezia.  GENITOURINARY: No dysuria, frequency, hematuria, or incontinence  NEUROLOGICAL: No headaches, memory loss, loss of strength, numbness, or tremors  SKIN: No itching, burning, rashes, or lesions   LYMPH NODES: No enlarged glands  ENDOCRINE: No heat or cold intolerance; No hair loss  PSYCHIATRIC: No depression, anxiety, mood swings, or difficulty sleeping  HEME/LYMPH: No easy bruising, or bleeding gums  ALLERGY AND IMMUNOLOGIC: No hives or eczema      MEDICATIONS  (STANDING):  acetaminophen     Tablet .. 650 milliGRAM(s) Oral every 6 hours  amLODIPine   Tablet 10 milliGRAM(s) Oral daily  aspirin enteric coated 81 milliGRAM(s) Oral every 12 hours  chlorhexidine 2% Cloths 1 Application(s) Topical <User Schedule>  levothyroxine 75 MICROGram(s) Oral daily  multivitamin 1 Tablet(s) Oral daily  pantoprazole    Tablet 40 milliGRAM(s) Oral before breakfast  polyethylene glycol 3350 17 Gram(s) Oral at bedtime  senna 2 Tablet(s) Oral at bedtime  sodium chloride 0.9%. 1000 milliLiter(s) (75 mL/Hr) IV Continuous <Continuous>    MEDICATIONS  (PRN):  aluminum hydroxide/magnesium hydroxide/simethicone Suspension 30 milliLiter(s) Oral four times a day PRN Indigestion  magnesium hydroxide Suspension 30 milliLiter(s) Oral daily PRN Constipation  ondansetron Injectable 4 milliGRAM(s) IV Push every 6 hours PRN Nausea and/or Vomiting  oxyCODONE    IR 5 milliGRAM(s) Oral every 4 hours PRN breakthrough pain  traMADol 50 milliGRAM(s) Oral every 4 hours PRN Moderate Pain (4 - 6)      Allergies  ciprofloxacin (Stomach Upset)  Diovan (Anaphylaxis)  losartan (Hives; Rash)  metoprolol (Short breath)  penicillin (Rash; Flushing; Hives)          Vital Signs Last 24 Hrs  T(C): 36.5 (15 Oct 2022 08:23), Max: 36.5 (15 Oct 2022 08:23)  T(F): 97.7 (15 Oct 2022 08:23), Max: 97.7 (15 Oct 2022 08:23)  HR: 76 (15 Oct 2022 08:23) (53 - 76)  BP: 147/64 (15 Oct 2022 08:23) (132/60 - 177/79)  RR: 16 (15 Oct 2022 04:08) (10 - 18)  SpO2: 94% (14 Oct 2022 18:00) (94% - 100%)    Parameters below as of 14 Oct 2022 18:00  Patient On (Oxygen Delivery Method): nasal cannula        PHYSICAL EXAM:  GENERAL: NAD, well-groomed, well-developed  HEAD:  Atraumatic, Normocephalic  EYES: EOMI, PERRLA, conjunctiva and sclera clear  ENMT: No tonsillar erythema, exudates, or enlargement; Moist mucous membranes, Good dentition, No lesions  NECK: Supple, No JVD, Normal thyroid  NERVOUS SYSTEM:  Alert & Oriented X3, Good concentration  CHEST/LUNG: CTA bilaterally; No rales, rhonchi, wheezing, or rubs  HEART: Regular rate and rhythm; No murmurs, rubs, or gallops  ABDOMEN: Soft, Nontender, Nondistended; Bowel sounds present      LABS:                        12.2   13.38 )-----------( 183      ( 15 Oct 2022 07:57 )             37.1     10-15    139  |  104  |  20  ----------------------------<  129<H>  4.4   |  26  |  0.8    Ca    9.0      15 Oct 2022 07:57

## 2022-10-15 NOTE — DISCHARGE NOTE NURSING/CASE MANAGEMENT/SOCIAL WORK - NSDCPEFALRISK_GEN_ALL_CORE
For information on Fall & Injury Prevention, visit: [email or web address] OR  [email or web address] OR  https://www.cdc.gov/steadi/patient.html

## 2022-10-15 NOTE — OCCUPATIONAL THERAPY INITIAL EVALUATION ADULT - GENERAL OBSERVATIONS, REHAB EVAL
9:19-10:15 Chart reviewed, ok to treat by Occupational Therapist as confirmed by RN Vera, Pt received seated in bedside chair +heplock on LUE +aquacel on right knee +ace wrap in NAD. Pt in agreement with OT IE.

## 2022-10-15 NOTE — CONSULT NOTE ADULT - ASSESSMENT
82 year old woman s/p orthopedic procedure.    Leukocytosis  -likely reactive after procedure  -Patient can be discharged from a medical standpoint

## 2022-10-15 NOTE — DISCHARGE NOTE NURSING/CASE MANAGEMENT/SOCIAL WORK - PATIENT PORTAL LINK FT
You can access the FollowMyHealth Patient Portal offered by [facility] by registering at the following website: [email or web address]. By joining [employer]’s FollowMyHealth portal, you will also be able to view your health information using other applications (apps) compatible with our system.

## 2022-10-15 NOTE — PROGRESS NOTE ADULT - SUBJECTIVE AND OBJECTIVE BOX
pt s&e  doing well  pain controlled  did well with PT  stable for DC home today with EPI/2% lidocaine

## 2022-10-15 NOTE — OCCUPATIONAL THERAPY INITIAL EVALUATION ADULT - LIVES WITH, PROFILE
Lives with daughter and son-in-law in a private home with 3 steps to enter and right hand railing and a flight of stair upstairs to get to bedroom and bathroom +walk in shower/children

## 2022-10-20 LAB — SURGICAL PATHOLOGY STUDY: SIGNIFICANT CHANGE UP

## 2022-10-25 DIAGNOSIS — M17.11 UNILATERAL PRIMARY OSTEOARTHRITIS, RIGHT KNEE: ICD-10-CM

## 2022-10-25 DIAGNOSIS — D72.829 ELEVATED WHITE BLOOD CELL COUNT, UNSPECIFIED: ICD-10-CM

## 2022-10-25 DIAGNOSIS — Z88.0 ALLERGY STATUS TO PENICILLIN: ICD-10-CM

## 2022-10-25 DIAGNOSIS — H26.9 UNSPECIFIED CATARACT: ICD-10-CM

## 2022-10-25 DIAGNOSIS — E66.9 OBESITY, UNSPECIFIED: ICD-10-CM

## 2022-10-25 DIAGNOSIS — E03.9 HYPOTHYROIDISM, UNSPECIFIED: ICD-10-CM

## 2022-10-25 DIAGNOSIS — I10 ESSENTIAL (PRIMARY) HYPERTENSION: ICD-10-CM

## 2022-10-28 RX ORDER — TRAMADOL HYDROCHLORIDE 50 MG/1
50 TABLET, COATED ORAL
Qty: 10 | Refills: 0 | Status: ACTIVE | COMMUNITY
Start: 2022-10-28 | End: 1900-01-01

## 2022-11-03 ENCOUNTER — APPOINTMENT (OUTPATIENT)
Dept: ORTHOPEDIC SURGERY | Facility: CLINIC | Age: 82
End: 2022-11-03

## 2022-11-03 ENCOUNTER — RESULT CHARGE (OUTPATIENT)
Age: 82
End: 2022-11-03

## 2022-11-03 PROBLEM — N32.81 OVERACTIVE BLADDER: Chronic | Status: ACTIVE | Noted: 2022-09-27

## 2022-11-03 PROBLEM — E66.9 OBESITY, UNSPECIFIED: Chronic | Status: ACTIVE | Noted: 2022-09-27

## 2022-11-03 PROBLEM — H91.90 UNSPECIFIED HEARING LOSS, UNSPECIFIED EAR: Chronic | Status: ACTIVE | Noted: 2022-09-27

## 2022-11-03 PROCEDURE — 99024 POSTOP FOLLOW-UP VISIT: CPT

## 2022-11-03 PROCEDURE — 73560 X-RAY EXAM OF KNEE 1 OR 2: CPT | Mod: 50

## 2022-11-03 RX ORDER — PANTOPRAZOLE 40 MG/1
40 TABLET, DELAYED RELEASE ORAL
Qty: 30 | Refills: 0 | Status: ACTIVE | COMMUNITY
Start: 2022-10-14

## 2022-11-03 RX ORDER — OXYCODONE 5 MG/1
5 TABLET ORAL
Qty: 9 | Refills: 0 | Status: ACTIVE | COMMUNITY
Start: 2022-10-14

## 2022-11-03 RX ORDER — AMLODIPINE BESYLATE 10 MG/1
10 TABLET ORAL
Qty: 90 | Refills: 0 | Status: ACTIVE | COMMUNITY
Start: 2022-10-02

## 2022-11-03 RX ORDER — LEVOTHYROXINE SODIUM 0.07 MG/1
75 TABLET ORAL
Qty: 90 | Refills: 0 | Status: ACTIVE | COMMUNITY
Start: 2022-09-01

## 2022-11-03 RX ORDER — TERAZOSIN 1 MG/1
1 CAPSULE ORAL
Qty: 90 | Refills: 0 | Status: ACTIVE | COMMUNITY
Start: 2022-10-02

## 2022-11-04 RX ORDER — NAPROXEN 500 MG/1
500 TABLET ORAL
Qty: 60 | Refills: 1 | Status: ACTIVE | COMMUNITY
Start: 2022-11-04 | End: 1900-01-01

## 2022-11-04 RX ORDER — TRAMADOL HYDROCHLORIDE 50 MG/1
50 TABLET, COATED ORAL TWICE DAILY
Qty: 60 | Refills: 0 | Status: ACTIVE | COMMUNITY
Start: 2022-11-04 | End: 1900-01-01

## 2022-11-08 NOTE — HISTORY OF PRESENT ILLNESS
[de-identified] : s/p Right TKA\par doing well\par postop course uncomplicated, home PT complete, progressing appropriately, now ready for OP PT. \par pain controlled\par (-)n/v/cp/sob\par \par Knee exam shows well healed incision\par NTTP\par AROM 2-90\par negative janelle\par \par Imaging: \par AP and Lateral views were obtained of the knees, including the contralateral knee for comparison.\par X-rays are negative for acute bone or soft tissue trauma.\par Right knee replacement in good alignment without radiographic evidence of complication.\par \par Plan:\par continue home exercise\par activities as tolerated\par OP PT\par continue dvt prophylaxis\par f/u at 6 weeks.\par

## 2022-11-28 NOTE — DISCHARGE NOTE NURSING/CASE MANAGEMENT/SOCIAL WORK - DATE OF SECOND COVID-19 BOOSTER
[facility]    Subjective:         Patient is a 30 y.o. male who is being seen as a returning patient for continued care regarding a left leg ulcer. Patient states he has completed his oral abx. Patient states he has been performing wound care every day. He states the dressings are less saturated than prior since switching to daily wound care. He admits to decreased odor. He denies any systemic signs of infection or recent trauma. He denies any other lower extremity complaints      Past Medical History:   Diagnosis Date    Hypertension 05/01/2021    Being treated by Janet Damon, currently on medication     Past Surgical History:   Procedure Laterality Date    HX HEENT  2014    wisdom teeth extraction       Family History   Problem Relation Age of Onset    Cancer Mother         Myellofibrosis    No Known Problems Father     Diabetes Maternal Aunt     Arthritis Maternal Grandmother     Diabetes Maternal Grandmother     OSTEOARTHRITIS Maternal Grandmother     Heart Disease Maternal Grandfather     Hypertension Other     Diabetes Maternal Aunt       Social History     Tobacco Use    Smoking status: Never    Smokeless tobacco: Never   Substance Use Topics    Alcohol use: No     No Known Allergies  Prior to Admission medications    Medication Sig Start Date End Date Taking? Authorizing Provider   lisinopril-hydroCHLOROthiazide (PRINZIDE, ZESTORETIC) 20-25 mg per tablet Take 1 Tablet by mouth daily. 9/30/21   Kiley Gao NP       Review of Systems   Constitutional: Negative. HENT: Negative. Eyes: Negative. Respiratory: Negative. Cardiovascular:  Positive for leg swelling. Gastrointestinal: Negative. Endocrine: Negative. Genitourinary: Negative. Musculoskeletal: Negative. Skin: Negative. Allergic/Immunologic: Negative. Neurological: Negative. Hematological: Negative. Psychiatric/Behavioral: Negative.      All other systems reviewed and are negative. Objective:     Visit Vitals  Temp 98 °F (36.7 °C) (Temporal)   Ht 6' (1.829 m)   Wt (!) 457 lb (207.3 kg)   BMI 61.98 kg/m²       Physical Exam  Vitals reviewed. Constitutional:       Appearance: He is morbidly obese. Cardiovascular:      Pulses:           Dorsalis pedis pulses are 2+ on the right side and 2+ on the left side. Posterior tibial pulses are 2+ on the right side and 2+ on the left side. Pulmonary:      Effort: Pulmonary effort is normal.   Musculoskeletal:      Right lower leg: Edema present. Left lower leg: Edema present. Right foot: Normal range of motion. No deformity or bunion. Left foot: Normal range of motion. No deformity or bunion. Feet:      Right foot:      Protective Sensation: 10 sites tested. 10 sites sensed. Skin integrity: Skin integrity normal.      Toenail Condition: Right toenails are normal.      Left foot:      Protective Sensation: 10 sites tested. 10 sites sensed. Skin integrity: Skin integrity normal.      Toenail Condition: Left toenails are normal.   Lymphadenopathy:      Lower Body: Right inguinal adenopathy present. Left inguinal adenopathy present. Skin:     General: Skin is warm. Capillary Refill: Capillary refill takes 2 to 3 seconds. Findings: Wound present. Neurological:      Mental Status: He is alert and oriented to person, place, and time. Psychiatric:         Mood and Affect: Mood and affect normal.         Behavior: Behavior is cooperative. Data Review: No results found for this or any previous visit (from the past 24 hour(s)). Impression:       ICD-10-CM ICD-9-CM    1. Lymphedema of both lower extremities  I89.0 457.1       2.  Skin ulcer of left lower leg, limited to breakdown of skin [facility])  L97.921 707.10           Recommendation:     Patient seen and evaluated in the office  Discussed and educated patient regarding his current medical condition  Local wound care performed to the left leg. Updated home wound care instructions given. Form completed for home lymphedema/wound care treatments. Patient to monitor for worsening signs of infection          Abelino Milan, [address] [facility], [address] [place] [address] and [facility]  [address] [place] [address]  O: [phone]  F: [phone]  C: [phone] 14-Jul-2022

## 2022-12-15 ENCOUNTER — APPOINTMENT (OUTPATIENT)
Dept: ORTHOPEDIC SURGERY | Facility: CLINIC | Age: 82
End: 2022-12-15

## 2022-12-16 ENCOUNTER — APPOINTMENT (OUTPATIENT)
Dept: ORTHOPEDIC SURGERY | Facility: CLINIC | Age: 82
End: 2022-12-16

## 2022-12-16 PROCEDURE — 99024 POSTOP FOLLOW-UP VISIT: CPT

## 2022-12-16 NOTE — HISTORY OF PRESENT ILLNESS
[de-identified] : s/p right tka\par doing well with rom but still with pain along the lateral side of the knee\par minimal swelling\par ROM 0-110\par knee stable\par plan for continued pt and transition to home exercises\par f/u in 8 weeks.

## 2023-01-19 NOTE — PRE-ANESTHESIA EVALUATION ADULT - RESPIRATORY RATE (BREATHS/MIN)
[facility] Pulmonary Progress Note  YENNY    Admit Date: 2023                            PCP: Garret Hines MD  Principal Problem:    Empyema [facility])  Dapnorberto Samuels, DO        ertapenem Ludy Quill) Myra Grove MD        trimethobenzamide Jeaneen Party) 

## 2023-02-09 ENCOUNTER — APPOINTMENT (OUTPATIENT)
Dept: ORTHOPEDIC SURGERY | Facility: CLINIC | Age: 83
End: 2023-02-09
Payer: MEDICARE

## 2023-02-09 PROCEDURE — 73560 X-RAY EXAM OF KNEE 1 OR 2: CPT | Mod: 50

## 2023-02-09 PROCEDURE — 99213 OFFICE O/P EST LOW 20 MIN: CPT

## 2023-02-09 NOTE — HISTORY OF PRESENT ILLNESS
[de-identified] : 82-year-old lady status post right knee replacement in October, now about 4 months out.  Still having some pain around the knee, on exam the pain is mainly around the anterior knee and medial femoral condyle.  Also has some tenderness around the Pez bursa.\par He has full range of motion, strength appears normal, no instability noted.\par \par Range of motion is 0-1 20\par \par Imaging:\par X-rays were reviewed with the patient.  \par AP and Lateral views were obtained of the knees, including the contralateral side for comparison purposes.\par X-rays are negative for acute bone or soft tissue trauma.\par Right knee replacement is in good position, arthritic changes noted in the left knee.\par \par Plan for continue home exercises.  She is not using anything for pain now except Tylenol.\par \par She will follow-up in 3 months. 13-Jul-2022 18:50

## 2023-04-11 RX ORDER — CLINDAMYCIN HYDROCHLORIDE 300 MG/1
300 CAPSULE ORAL
Qty: 2 | Refills: 0 | Status: ACTIVE | COMMUNITY
Start: 2023-04-11 | End: 1900-01-01

## 2023-05-11 ENCOUNTER — APPOINTMENT (OUTPATIENT)
Dept: ORTHOPEDIC SURGERY | Facility: CLINIC | Age: 83
End: 2023-05-11
Payer: MEDICARE

## 2023-05-11 PROCEDURE — 73560 X-RAY EXAM OF KNEE 1 OR 2: CPT | Mod: 50

## 2023-05-11 PROCEDURE — 99213 OFFICE O/P EST LOW 20 MIN: CPT

## 2023-05-11 NOTE — HISTORY OF PRESENT ILLNESS
[de-identified] : f/u of right tka\par still having pain\par now almost 7 months\par knee appears to be healing nicely\par full rom without pain\par notes pain with walking\par \par Imaging:\par X-rays were reviewed with the patient.  \par AP and Lateral views were obtained of the knees, including the contralateral side for comparison purposes.\par X-rays are negative for acute bone or soft tissue trauma.\par right knee replacement appears in good position\par \par plan:\par will restartPT\par tylenol for pain\par f/u in 3 months.

## 2023-06-23 ENCOUNTER — NON-APPOINTMENT (OUTPATIENT)
Age: 83
End: 2023-06-23

## 2023-06-24 ENCOUNTER — INPATIENT (INPATIENT)
Facility: HOSPITAL | Age: 83
LOS: 1 days | Discharge: HOME | DRG: 392 | End: 2023-06-26
Attending: INTERNAL MEDICINE | Admitting: INTERNAL MEDICINE
Payer: MEDICARE

## 2023-06-24 VITALS
WEIGHT: 169.98 LBS | TEMPERATURE: 98 F | HEIGHT: 62 IN | OXYGEN SATURATION: 97 % | RESPIRATION RATE: 18 BRPM | HEART RATE: 80 BPM | DIASTOLIC BLOOD PRESSURE: 77 MMHG | SYSTOLIC BLOOD PRESSURE: 173 MMHG

## 2023-06-24 DIAGNOSIS — Z98.49 CATARACT EXTRACTION STATUS, UNSPECIFIED EYE: Chronic | ICD-10-CM

## 2023-06-24 DIAGNOSIS — Z96.1 PRESENCE OF INTRAOCULAR LENS: Chronic | ICD-10-CM

## 2023-06-24 DIAGNOSIS — Z90.89 ACQUIRED ABSENCE OF OTHER ORGANS: Chronic | ICD-10-CM

## 2023-06-24 DIAGNOSIS — Z98.890 OTHER SPECIFIED POSTPROCEDURAL STATES: Chronic | ICD-10-CM

## 2023-06-24 DIAGNOSIS — Z90.710 ACQUIRED ABSENCE OF BOTH CERVIX AND UTERUS: Chronic | ICD-10-CM

## 2023-06-24 DIAGNOSIS — K52.9 NONINFECTIVE GASTROENTERITIS AND COLITIS, UNSPECIFIED: ICD-10-CM

## 2023-06-24 DIAGNOSIS — J34.2 DEVIATED NASAL SEPTUM: Chronic | ICD-10-CM

## 2023-06-24 LAB
ALBUMIN SERPL ELPH-MCNC: 4.4 G/DL — SIGNIFICANT CHANGE UP (ref 3.5–5.2)
ALP SERPL-CCNC: 88 U/L — SIGNIFICANT CHANGE UP (ref 30–115)
ALT FLD-CCNC: 17 U/L — SIGNIFICANT CHANGE UP (ref 0–41)
ANION GAP SERPL CALC-SCNC: 13 MMOL/L — SIGNIFICANT CHANGE UP (ref 7–14)
APPEARANCE UR: CLEAR — SIGNIFICANT CHANGE UP
APTT BLD: 28.2 SEC — SIGNIFICANT CHANGE UP (ref 27–39.2)
AST SERPL-CCNC: 20 U/L — SIGNIFICANT CHANGE UP (ref 0–41)
BACTERIA # UR AUTO: NEGATIVE — SIGNIFICANT CHANGE UP
BASOPHILS # BLD AUTO: 0.03 K/UL — SIGNIFICANT CHANGE UP (ref 0–0.2)
BASOPHILS NFR BLD AUTO: 0.4 % — SIGNIFICANT CHANGE UP (ref 0–1)
BILIRUB SERPL-MCNC: 0.9 MG/DL — SIGNIFICANT CHANGE UP (ref 0.2–1.2)
BILIRUB UR-MCNC: NEGATIVE — SIGNIFICANT CHANGE UP
BUN SERPL-MCNC: 20 MG/DL — SIGNIFICANT CHANGE UP (ref 10–20)
CALCIUM SERPL-MCNC: 9.6 MG/DL — SIGNIFICANT CHANGE UP (ref 8.4–10.4)
CHLORIDE SERPL-SCNC: 104 MMOL/L — SIGNIFICANT CHANGE UP (ref 98–110)
CO2 SERPL-SCNC: 23 MMOL/L — SIGNIFICANT CHANGE UP (ref 17–32)
COLOR SPEC: SIGNIFICANT CHANGE UP
CREAT SERPL-MCNC: 0.8 MG/DL — SIGNIFICANT CHANGE UP (ref 0.7–1.5)
DIFF PNL FLD: NEGATIVE — SIGNIFICANT CHANGE UP
EGFR: 74 ML/MIN/1.73M2 — SIGNIFICANT CHANGE UP
EOSINOPHIL # BLD AUTO: 0.08 K/UL — SIGNIFICANT CHANGE UP (ref 0–0.7)
EOSINOPHIL NFR BLD AUTO: 1 % — SIGNIFICANT CHANGE UP (ref 0–8)
EPI CELLS # UR: 6 /HPF — HIGH (ref 0–5)
GLUCOSE SERPL-MCNC: 101 MG/DL — HIGH (ref 70–99)
GLUCOSE UR QL: NEGATIVE — SIGNIFICANT CHANGE UP
HCT VFR BLD CALC: 40.5 % — SIGNIFICANT CHANGE UP (ref 37–47)
HGB BLD-MCNC: 13.3 G/DL — SIGNIFICANT CHANGE UP (ref 12–16)
HYALINE CASTS # UR AUTO: 2 /LPF — SIGNIFICANT CHANGE UP (ref 0–7)
IMM GRANULOCYTES NFR BLD AUTO: 0.4 % — HIGH (ref 0.1–0.3)
INR BLD: 1.02 RATIO — SIGNIFICANT CHANGE UP (ref 0.65–1.3)
KETONES UR-MCNC: NEGATIVE — SIGNIFICANT CHANGE UP
LEUKOCYTE ESTERASE UR-ACNC: ABNORMAL
LIDOCAIN IGE QN: 23 U/L — SIGNIFICANT CHANGE UP (ref 7–60)
LYMPHOCYTES # BLD AUTO: 1.47 K/UL — SIGNIFICANT CHANGE UP (ref 1.2–3.4)
LYMPHOCYTES # BLD AUTO: 17.9 % — LOW (ref 20.5–51.1)
MCHC RBC-ENTMCNC: 30.6 PG — SIGNIFICANT CHANGE UP (ref 27–31)
MCHC RBC-ENTMCNC: 32.8 G/DL — SIGNIFICANT CHANGE UP (ref 32–37)
MCV RBC AUTO: 93.3 FL — SIGNIFICANT CHANGE UP (ref 81–99)
MONOCYTES # BLD AUTO: 0.51 K/UL — SIGNIFICANT CHANGE UP (ref 0.1–0.6)
MONOCYTES NFR BLD AUTO: 6.2 % — SIGNIFICANT CHANGE UP (ref 1.7–9.3)
NEUTROPHILS # BLD AUTO: 6.1 K/UL — SIGNIFICANT CHANGE UP (ref 1.4–6.5)
NEUTROPHILS NFR BLD AUTO: 74.1 % — SIGNIFICANT CHANGE UP (ref 42.2–75.2)
NITRITE UR-MCNC: NEGATIVE — SIGNIFICANT CHANGE UP
NRBC # BLD: 0 /100 WBCS — SIGNIFICANT CHANGE UP (ref 0–0)
PH UR: 6.5 — SIGNIFICANT CHANGE UP (ref 5–8)
PLATELET # BLD AUTO: 187 K/UL — SIGNIFICANT CHANGE UP (ref 130–400)
PMV BLD: 11.5 FL — HIGH (ref 7.4–10.4)
POTASSIUM SERPL-MCNC: 4.4 MMOL/L — SIGNIFICANT CHANGE UP (ref 3.5–5)
POTASSIUM SERPL-SCNC: 4.4 MMOL/L — SIGNIFICANT CHANGE UP (ref 3.5–5)
PROT SERPL-MCNC: 6.4 G/DL — SIGNIFICANT CHANGE UP (ref 6–8)
PROT UR-MCNC: NEGATIVE — SIGNIFICANT CHANGE UP
PROTHROM AB SERPL-ACNC: 11.6 SEC — SIGNIFICANT CHANGE UP (ref 9.95–12.87)
RBC # BLD: 4.34 M/UL — SIGNIFICANT CHANGE UP (ref 4.2–5.4)
RBC # FLD: 13.4 % — SIGNIFICANT CHANGE UP (ref 11.5–14.5)
RBC CASTS # UR COMP ASSIST: 1 /HPF — SIGNIFICANT CHANGE UP (ref 0–4)
SODIUM SERPL-SCNC: 140 MMOL/L — SIGNIFICANT CHANGE UP (ref 135–146)
SP GR SPEC: 1.02 — SIGNIFICANT CHANGE UP (ref 1.01–1.03)
UROBILINOGEN FLD QL: SIGNIFICANT CHANGE UP
WBC # BLD: 8.22 K/UL — SIGNIFICANT CHANGE UP (ref 4.8–10.8)
WBC # FLD AUTO: 8.22 K/UL — SIGNIFICANT CHANGE UP (ref 4.8–10.8)
WBC UR QL: 12 /HPF — HIGH (ref 0–5)

## 2023-06-24 PROCEDURE — 74177 CT ABD & PELVIS W/CONTRAST: CPT | Mod: 26,MA

## 2023-06-24 PROCEDURE — 82962 GLUCOSE BLOOD TEST: CPT

## 2023-06-24 PROCEDURE — 99285 EMERGENCY DEPT VISIT HI MDM: CPT

## 2023-06-24 PROCEDURE — 99223 1ST HOSP IP/OBS HIGH 75: CPT

## 2023-06-24 PROCEDURE — 36415 COLL VENOUS BLD VENIPUNCTURE: CPT

## 2023-06-24 PROCEDURE — G0378: CPT

## 2023-06-24 PROCEDURE — 99497 ADVNCD CARE PLAN 30 MIN: CPT | Mod: 25

## 2023-06-24 PROCEDURE — 85027 COMPLETE CBC AUTOMATED: CPT

## 2023-06-24 RX ORDER — METRONIDAZOLE 500 MG
500 TABLET ORAL ONCE
Refills: 0 | Status: COMPLETED | OUTPATIENT
Start: 2023-06-24 | End: 2023-06-24

## 2023-06-24 RX ORDER — PANTOPRAZOLE SODIUM 20 MG/1
40 TABLET, DELAYED RELEASE ORAL
Refills: 0 | Status: DISCONTINUED | OUTPATIENT
Start: 2023-06-24 | End: 2023-06-26

## 2023-06-24 RX ORDER — DOXAZOSIN MESYLATE 4 MG
1 TABLET ORAL AT BEDTIME
Refills: 0 | Status: DISCONTINUED | OUTPATIENT
Start: 2023-06-24 | End: 2023-06-26

## 2023-06-24 RX ORDER — AMLODIPINE BESYLATE 2.5 MG/1
10 TABLET ORAL DAILY
Refills: 0 | Status: DISCONTINUED | OUTPATIENT
Start: 2023-06-24 | End: 2023-06-26

## 2023-06-24 RX ORDER — METRONIDAZOLE 500 MG
500 TABLET ORAL EVERY 8 HOURS
Refills: 0 | Status: DISCONTINUED | OUTPATIENT
Start: 2023-06-25 | End: 2023-06-26

## 2023-06-24 RX ORDER — AZTREONAM 2 G
2000 VIAL (EA) INJECTION ONCE
Refills: 0 | Status: COMPLETED | OUTPATIENT
Start: 2023-06-24 | End: 2023-06-24

## 2023-06-24 RX ORDER — LEVOTHYROXINE SODIUM 125 MCG
75 TABLET ORAL DAILY
Refills: 0 | Status: DISCONTINUED | OUTPATIENT
Start: 2023-06-24 | End: 2023-06-26

## 2023-06-24 RX ORDER — AZTREONAM 2 G
1000 VIAL (EA) INJECTION EVERY 12 HOURS
Refills: 0 | Status: DISCONTINUED | OUTPATIENT
Start: 2023-06-25 | End: 2023-06-26

## 2023-06-24 RX ADMIN — Medication 100 MILLIGRAM(S): at 18:20

## 2023-06-24 RX ADMIN — Medication 100 MILLIGRAM(S): at 21:26

## 2023-06-24 RX ADMIN — Medication 1 MILLIGRAM(S): at 21:26

## 2023-06-24 NOTE — ED PROVIDER NOTE - NSICDXPASTMEDICALHX_GEN_ALL_CORE_FT
PAST MEDICAL HISTORY:  Bilateral cataracts     [place] (hard of hearing)     Hypertension     Hypothyroidism     Obesity BMI 35    Overactive bladder

## 2023-06-24 NOTE — ED PROVIDER NOTE - PHYSICAL EXAMINATION
CONST: Well appearing in NAD  EYES: PERRL, EOMI, Sclera and conjunctiva clear.   ENT: Oropharynx normal appearing, no erythema or exudates. Uvula midline.  CARD: Normal S1 S2; Normal rate and rhythm  RESP: Equal BS B/L, No wheezes, rhonchi or rales. No distress  GI: Lower abdominal tenderness. Soft, no rebound.   MS: Normal ROM in all extremities. No midline spinal tenderness.  SKIN: Warm, dry, no acute rashes. Good turgor  NEURO: A&Ox3, No focal deficits. Strength 5/5 with no sensory deficits. Steady gait

## 2023-06-24 NOTE — H&P ADULT - ASSESSMENT
83-year-old female presents emergency department with complaint with multiple episodes of diarrhea a/w  bright red blood.  CT scan showed colitis.    PLEASE confirm MED RECC with one of the following:  Online mailing pharmacy ([facility], #[phone])  Daughter Nerissa: [phone]  Emergency contact: [phone]    #Colitis   #Diarrhea  #Lower GI bleed   *CT abd/ pelvis showed colon thickening consistent with colitis  *Hg within normal limits 13.3  *wbc normal 8.22  -c/w metronidazole  -patient refused ciprofloxacin because it caused diarrhea the last time she took it, she did not have any sign of allergy from ciprofloxacin  -GI consult for colonoscopy, given family hx of Colon cancer  -Trend Hg Transfuse if Hgb<8 or rapid drop.   -Maintain active type and screen.     #HTN - uncontrolled  -c/w home amlodipine 10mg QD  -c/w home terazosin (exchanged for doxazosin)  -consider adding ARB or ACE if still uncontrolled    #Hypothyroidism  -c/w home Levothyroxine 75mcg    MISC  Diet: Clear liquid, advance as tolerated  Activity: IAT  DVT: if Hg stable in AM start Lovenox   Status: Full code  Pending: f/u GI, f/u AM labs, advance diet, confirm med recc in AM   83-year-old female presents emergency department with complaint with multiple episodes of diarrhea a/w  bright red blood.  CT scan showed colitis.    PLEASE confirm MED RECC with one of the following:  Online mailing pharmacy ([facility], #[phone])  Daughter Nerissa: [phone]  Emergency contact: [phone]    #Colitis   #Diarrhea  #Lower GI bleed   *CT abd/ pelvis showed colon thickening consistent with colitis  *Hg within normal limits 13.3  *wbc normal 8.22  -c/w metronidazole and azectam  -patient refused ciprofloxacin because it caused diarrhea the last time she took it, she did not have any sign of allergy from ciprofloxacin  -GI consult for colonoscopy, given family hx of Colon cancer  -Trend Hg Transfuse if Hgb<8 or rapid drop.   -Maintain active type and screen.     #HTN - uncontrolled  -c/w home amlodipine 10mg QD  -c/w home terazosin (exchanged for doxazosin)  -consider adding ARB or ACE if still uncontrolled    #Hypothyroidism  -c/w home Levothyroxine 75mcg    MISC  Diet: Clear liquid, advance as tolerated  Activity: IAT  DVT: if Hg stable in AM start Lovenox   Status: Full code  Pending: f/u GI, f/u AM labs, advance diet, confirm med recc in AM

## 2023-06-24 NOTE — H&P ADULT - HISTORY OF PRESENT ILLNESS
83-year-old female presents emergency department with complaint with multiple episodes of diarrhea a/w  bright red blood.  Patient seen at urgent care prior to admission and had a CHAYO done which showed bright red blood per rectum, advised to go to ED for further evaluation.  Patient denies AC use, nausea, vomiting, fever, chills, chest pain, shortness of breath, lightheadedness, dizziness. Patient reported that brother has Colon cancer (does not recall at what age). Last colonoscopy done was years ago.    83-year-old female presents emergency department with complaint with multiple episodes of diarrhea a/w  bright red blood.  Patient seen at urgent care prior to admission and had a CHAYO done which showed bright red blood per rectum, advised to go to ED for further evaluation.  Patient denies AC use, nausea, vomiting, fever, chills, chest pain, shortness of breath, lightheadedness, dizziness. Patient reported that brother has Colon cancer (does not recall at what age). Last colonoscopy done was years ago.      admits to history of hemorrhoids colonic polyps.  Last colonoscopy greater than 10 years ago.  no bleeding a this time

## 2023-06-24 NOTE — ED ADULT NURSE NOTE - NSFALLHARMRISKINTERV_ED_ALL_ED

## 2023-06-24 NOTE — ED PROVIDER NOTE - ATTENDING APP SHARED VISIT CONTRIBUTION OF CARE
82 yo f with pmh of bloody BMs.  pt says started a few days ago, but today she had abd pain so went to [facility] care and was referred to ED.  pt denies AC use or ASA use.  no recent colonoscopy.  last cscope, had polypectomy.  cannot recall last gi.  pt denies n/v.  no fever or chills.  no cp or sob.  exam: nad, ncat, perrl, eomi, mmm, rrr, ctab, abd soft, mildly ttp, nd, CHAYO as per PA, aox3, imp: pt with bloody bm and abd pain, will check labs, CT a/p

## 2023-06-24 NOTE — ED PROVIDER NOTE - PROGRESS NOTE DETAILS
JS: Patient with reported allergy to Cipro and penicillins, unable to be discharged with outpatient regimen.  Agreeable for inpatient stay for IV antibiotics.

## 2023-06-24 NOTE — H&P ADULT - ATTENDING COMMENTS
83-year-old female presents emergency department with complaint with multiple episodes of diarrhea a/w  bright red blood.  CT scan showed colitis.      #Colitis   #Diarrhea  #Lower GI bleed   *CT abd/ pelvis showed colon thickening consistent with colitis  *Hg within normal limits 13.3  *wbc normal 8.22  -c/w metronidazole and Azactam (tolerated in the ED)  -multiple drug allergy  -GI consult for colonoscopy, given family hx of Colon cancer  -Trend Hg Transfuse if Hgb<8 or rapid drop.   -Maintain active type and screen.   -Occult, stool culture, and GI PCR    #HTN - uncontrolled  -c/w home amlodipine 10mg QD  -c/w home terazosin (exchanged for doxazosin)  -consider adding ARB or ACE if still uncontrolled    #Hypothyroidism  -c/w home Levothyroxine 75mcg

## 2023-06-24 NOTE — H&P ADULT - NSHPREVIEWOFSYSTEMS_GEN_ALL_CORE
REVIEW OF SYSTEMS:    CONSTITUTIONAL: No weakness, fevers or chills  EYES/ENT: No visual changes;  No vertigo or throat pain   NECK: No pain or stiffness  RESPIRATORY: No cough, wheezing, hemoptysis; No shortness of breath  CARDIOVASCULAR: No chest pain or palpitations  GASTROINTESTINAL: mild abdominal pain, diarrhea, blood per rectum  GENITOURINARY: No dysuria, frequency or hematuria  NEUROLOGICAL: No numbness or weakness  SKIN: No itching, rashes

## 2023-06-24 NOTE — H&P ADULT - CONVERSATION DETAILS
Current plan of care, and prognosis were discussed with patient in details, all option were discussed in details  including CPR procedure, shock procedure, and intubation procedure.   Explained that duration of machines/inbutaion/pressor are unknown, and they are based on the underline issue.   patient opted for full code with full understanding of what it involves in details  time 30min

## 2023-06-24 NOTE — H&P ADULT - NSHPPHYSICALEXAM_GEN_ALL_CORE
PHYSICAL EXAM:  GENERAL: NAD, speaks in full sentences, no signs of respiratory distress  HEAD:  Atraumatic, Normocephalic  EYES: EOMI, PERRLA, anicteric sclera  NECK: Supple, No JVD  CHEST/LUNG: Clear to auscultation bilaterally; No wheeze; No crackles; No accessory muscles used  HEART: Regular rate and rhythm; No murmurs;   ABDOMEN: diffuse mild tenderness  EXTREMITIES: No cyanosis or edema  PSYCH: AAOx3  NEUROLOGY: non-focal Vital Signs Last 24 Hrs  T(C): 37 (24 Jun 2023 15:42), Max: 37 (24 Jun 2023 15:42)  T(F): 98.6 (24 Jun 2023 15:42), Max: 98.6 (24 Jun 2023 15:42)  HR: 74 (24 Jun 2023 20:25) (74 - 84)  BP: 174/72 (24 Jun 2023 20:25) (145/64 - 174/72)  BP(mean): 104 (24 Jun 2023 20:25) (104 - 104)  RR: 18 (24 Jun 2023 20:25) (18 - 18)  SpO2: 96% (24 Jun 2023 20:25) (96% - 98%)    Parameters below as of 24 Jun 2023 20:25  Patient On (Oxygen Delivery Method): room air    PHYSICAL EXAM:  GENERAL: NAD, speaks in full sentences, no signs of respiratory distress  HEAD:  Atraumatic, Normocephalic  EYES: EOMI, PERRLA, anicteric sclera  NECK: Supple, No JVD  CHEST/LUNG: Clear to auscultation bilaterally; No wheeze; No crackles; No accessory muscles used  HEART: Regular rate and rhythm; No murmurs;   ABDOMEN: diffuse mild tenderness  EXTREMITIES: No cyanosis or edema  PSYCH: AAOx3  NEUROLOGY: non-focal

## 2023-06-24 NOTE — ED PROVIDER NOTE - CLINICAL SUMMARY MEDICAL DECISION MAKING FREE TEXT BOX
Pt with colitis, unable to take cipro or augmentin due to reported allergies.  will give aztreonam and flagyl iv and admit to med.  Any ordered labs and EKG were reviewed.  Any imaging was ordered and reviewed by me.  Appropriate medications for patient's presenting complaints were ordered and effects were reassessed.  Patient's records (prior hospital, ED visit, and/or nursing home notes if available) were reviewed.  Additional history was obtained from EMS, family, and/or PCP (where available).  Escalation to admission/observation was considered.  Patient requires inpatient hospitalization - monitored setting.

## 2023-06-24 NOTE — ED ADULT TRIAGE NOTE - CHIEF COMPLAINT QUOTE
Patient sent in from urgent care a+ox3 co diarrhea since yesterday with small amount of blood in stool this morning - denies abdominal pain, n/v

## 2023-06-24 NOTE — PATIENT PROFILE ADULT - FALL HARM RISK - RISK INTERVENTIONS
Assistance OOB with selected safe patient handling equipment/Communicate Fall Risk and Risk Factors to all staff, patient, and family/Reinforce activity limits and safety measures with patient and family/Visual Cue: Yellow wristband/Bed in lowest position, wheels locked, appropriate side rails in place/Call bell, personal items and telephone in reach/Instruct patient to call for assistance before getting out of bed or chair/Non-slip footwear when patient is out of bed/[place] to call system/Physically safe environment - no spills, clutter or unnecessary equipment/Purposeful Proactive Rounding/Room/bathroom lighting operational, light cord in reach

## 2023-06-24 NOTE — H&P ADULT - NSHPLABSRESULTS_GEN_ALL_CORE
.  LABS:                         13.3   8.22  )-----------( 187      ( 2023 15:35 )             40.5         140  |  104  |  20  ----------------------------<  101<H>  4.4   |  23  |  0.8    Ca    9.6      2023 15:35    TPro  6.4  /  Alb  4.4  /  TBili  0.9  /  DBili  x   /  AST  20  /  ALT  17  /  AlkPhos  88  06-24    PT/INR - ( 2023 15:35 )   PT: 11.60 sec;   INR: 1.02 ratio         PTT - ( 2023 15:35 )  PTT:28.2 sec  Urinalysis Basic - ( 2023 16:35 )    Color: Light Yellow / Appearance: Clear / S.021 / pH: x  Gluc: x / Ketone: Negative  / Bili: Negative / Urobili: <2 mg/dL   Blood: x / Protein: Negative / Nitrite: Negative   Leuk Esterase: Large / RBC: 1 /HPF / WBC 12 /HPF   Sq Epi: x / Non Sq Epi: x / Bacteria: Negative      RADIOLOGY, EKG & ADDITIONAL TESTS: Reviewed.   CT abd/pelvis:   1.  Wall thickening of the colon extending from the splenic flexure to   approximately the distal sigmoid colon consistent with colitis of   infectious or inflammatory etiology.    2.  No bowel obstruction. No free air.

## 2023-06-24 NOTE — H&P ADULT - NSICDXPASTMEDICALHX_GEN_ALL_CORE_FT
PAST MEDICAL HISTORY:  Bilateral cataracts     [place] (hard of hearing)     Hypertension     Hypothyroidism     Obesity BMI 35    Overactive bladder

## 2023-06-24 NOTE — PATIENT PROFILE ADULT - FUNCTIONAL ASSESSMENT - DAILY ACTIVITY ASSESSMENT TYPE
- The patient is currently on symptom triggered CIWA.  - last CIWA scores in the 2 to 3 range, has not required any PRN ativan Admission

## 2023-06-24 NOTE — ED PROVIDER NOTE - OBJECTIVE STATEMENT
83-year-old female presents emergency department with complaint  With multiple episodes of bloody stool, bright red blood.  Patient seen and urgent care today had a CHAYO done which showed bright red blood per rectum, advised to go to ED for further evaluation.  Patient denies AC use, nausea, vomiting, fever, chills, chest pain, shortness of breath, lightheadedness, dizziness.    Admits to history of hemorrhoids colonic polyps.  Last colonoscopy greater than 10 years ago.

## 2023-06-24 NOTE — ED ADULT NURSE NOTE - OBJECTIVE STATEMENT
pt here c.o diarrhea and blood in stool since yesterday. pt reported pain with bowel movement. no n/v/fevers.

## 2023-06-24 NOTE — ED ADULT NURSE NOTE - NSICDXPASTMEDICALHX_GEN_ALL_CORE_FT
PAST MEDICAL HISTORY:  Bilateral cataracts     [place]     Hypertension     Hypothyroidism     Obesity BMI 35    Overactive bladder

## 2023-06-25 LAB
GLUCOSE BLDC GLUCOMTR-MCNC: 80 MG/DL — SIGNIFICANT CHANGE UP (ref 70–99)
GLUCOSE BLDC GLUCOMTR-MCNC: 97 MG/DL — SIGNIFICANT CHANGE UP (ref 70–99)
HCT VFR BLD CALC: 35.9 % — LOW (ref 37–47)
HGB BLD-MCNC: 11.6 G/DL — LOW (ref 12–16)
MCHC RBC-ENTMCNC: 29.9 PG — SIGNIFICANT CHANGE UP (ref 27–31)
MCHC RBC-ENTMCNC: 32.3 G/DL — SIGNIFICANT CHANGE UP (ref 32–37)
MCV RBC AUTO: 92.5 FL — SIGNIFICANT CHANGE UP (ref 81–99)
NRBC # BLD: 0 /100 WBCS — SIGNIFICANT CHANGE UP (ref 0–0)
PLATELET # BLD AUTO: 177 K/UL — SIGNIFICANT CHANGE UP (ref 130–400)
PMV BLD: 11.4 FL — HIGH (ref 7.4–10.4)
RBC # BLD: 3.88 M/UL — LOW (ref 4.2–5.4)
RBC # FLD: 13.5 % — SIGNIFICANT CHANGE UP (ref 11.5–14.5)
WBC # BLD: 6.79 K/UL — SIGNIFICANT CHANGE UP (ref 4.8–10.8)
WBC # FLD AUTO: 6.79 K/UL — SIGNIFICANT CHANGE UP (ref 4.8–10.8)

## 2023-06-25 PROCEDURE — 99232 SBSQ HOSP IP/OBS MODERATE 35: CPT

## 2023-06-25 RX ADMIN — Medication 75 MICROGRAM(S): at 06:07

## 2023-06-25 RX ADMIN — Medication 50 MILLIGRAM(S): at 06:08

## 2023-06-25 RX ADMIN — PANTOPRAZOLE SODIUM 40 MILLIGRAM(S): 20 TABLET, DELAYED RELEASE ORAL at 06:08

## 2023-06-25 RX ADMIN — Medication 1 MILLIGRAM(S): at 21:56

## 2023-06-25 RX ADMIN — Medication 50 MILLIGRAM(S): at 18:00

## 2023-06-25 RX ADMIN — AMLODIPINE BESYLATE 10 MILLIGRAM(S): 2.5 TABLET ORAL at 06:07

## 2023-06-25 RX ADMIN — Medication 100 MILLIGRAM(S): at 17:00

## 2023-06-25 RX ADMIN — Medication 100 MILLIGRAM(S): at 09:00

## 2023-06-25 RX ADMIN — Medication 100 MILLIGRAM(S): at 01:22

## 2023-06-25 NOTE — CONSULT NOTE ADULT - ASSESSMENT
83-year-old female presents emergency department with complaint with multiple episodes of diarrhea a/w  bright red blood.  GI was consulted for diarrhea associated with bright red blood per rectum.  Per patient 2 days ago after a eating food she started having like multiple episodes of loose stools happened within 1 hour duration followed by small episodes of bright red bleeding per rectum since yesterday in the morning she did not have like any bloody bowel movement currently her abdominal pain got better from time she complains of mucousy stools that has been going on for couple of denies any fecal urgency incontinence tenesmus any family history of inflammatory bowel disease.     #)Abdominal pain diarrhea differential diagnosis includes infectious versus inflammatory versus less likely ischemic  Hemodynamically stable   hemoglobin stable   CAT scan of the abdomen showed thickening of the colon from splenic flexure to the distal sigmoid   patient is a [employer]  No bowel movement since yesterday in the morning  Abdominal pain improved  Tolerating liquid diet  Last colonoscopy 10 years ago as per patient reported normal    Recommendation  Check GI PCR if patient has a bowel movement along with fecal calprotectin  Check ESR CRP  Continue with antibiotics for total 5 to 7 days  Advance diet as tolerated  Need to follow-up as an outpatient to repeat colonoscopy 4 to 6 weeks later     recall as needed

## 2023-06-25 NOTE — PROGRESS NOTE ADULT - ASSESSMENT
83-year-old female presents emergency department with complaint with multiple episodes of diarrhea a/w  bright red blood.  CT scan showed colitis.    PLEASE confirm MED RECC with one of the following:  Online mailing pharmacy ([facility], #[phone])  Daughter Nerissa: [phone]  Emergency contact: [phone]    #Colitis   #Diarrhea  #Lower GI bleed   *CT abd/ pelvis showed colon thickening consistent with colitis  *Hg within normal limits 13.3  *wbc normal 8.22  -c/w metronidazole and azectam  -patient refused ciprofloxacin because it caused diarrhea the last time she took it, she did not have any sign of allergy from ciprofloxacin  -GI consult for colonoscopy, given family hx of Colon cancer  -Trend Hg Transfuse if Hgb<8 or rapid drop.   -Maintain active type and screen.     #HTN - uncontrolled  -c/w home amlodipine 10mg QD  -c/w home terazosin (exchanged for doxazosin)  -consider adding ARB or ACE if still uncontrolled    #Hypothyroidism  -c/w home Levothyroxine 75mcg    MISC  Diet: Clear liquid, advance as tolerated  Activity: IAT  DVT: if Hg stable in AM start Lovenox   Status: Full code  Pending: f/u GI, f/u AM labs, advance diet, confirm med recc in AM

## 2023-06-25 NOTE — PROGRESS NOTE ADULT - SUBJECTIVE AND OBJECTIVE BOX
SUBJECTIVE:  HPI:   83-year-old female presents emergency department with complaint with multiple episodes of diarrhea a/w  bright red blood.  Patient seen at urgent care prior to admission and had a CHAYO done which showed bright red blood per rectum, advised to go to ED for further evaluation.  Patient denies AC use, nausea, vomiting, fever, chills, chest pain, shortness of breath, lightheadedness, dizziness. Patient reported that brother has Colon cancer (does not recall at what age). Last colonoscopy done was years ago.      admits to history of hemorrhoids colonic polyps.  Last colonoscopy greater than 10 years ago.  no bleeding a this time    (2023 21:54)      Patient is a 83y old Female who presents with a chief complaint of Lower GI bleed (2023 21:54)    Currently admitted to medicine with the primary diagnosis of Colitis  Today is hospital day 1d.     INTERVAL HISTORY AND OVERNIGHT EVENTS:  Patient is seen at bedside. She has no acute complaints.  No significant overnight events.    PAST MEDICAL & SURGICAL HISTORY  Hypertension    Hypothyroidism    Bilateral cataracts    Obesity  BMI 35    [place] (hard of hearing)    Overactive bladder    S/P bladder repair    S/P hysterectomy    History of intraocular lens implant  RIGHT    S/P tonsillectomy and adenoidectomy    S/P colonoscopy      Deviated septum  1980s    H/O cataract extraction  Bilateral        ALLERGIES:  penicillin (Rash; Flushing; Hives)  metoprolol (Short breath)  losartan (Hives; Rash)  ciprofloxacin (Stomach Upset)  Diovan (Anaphylaxis)    MEDICATIONS:  ACTIVE MEDICATIONS  amLODIPine   Tablet 10 milliGRAM(s) Oral daily  aztreonam  IVPB 1000 milliGRAM(s) IV Intermittent every 12 hours  doxazosin 1 milliGRAM(s) Oral at bedtime  levothyroxine 75 MICROGram(s) Oral daily  metroNIDAZOLE  IVPB 500 milliGRAM(s) IV Intermittent every 8 hours  pantoprazole    Tablet 40 milliGRAM(s) Oral before breakfast      VITALS:   T(F): 97.8  HR: 84  BP: 131/65  RR: 18  SpO2: 96%    LABS:                        11.6   6.79  )-----------( 177      ( 2023 01:19 )             35.9     06-24    140  |  104  |  20  ----------------------------<  101<H>  4.4   |  23  |  0.8    Ca    9.6      2023 15:35    TPro  6.4  /  Alb  4.4  /  TBili  0.9  /  DBili  x   /  AST  20  /  ALT  17  /  AlkPhos  88  06-24    PT/INR - ( 2023 15:35 )   PT: 11.60 sec;   INR: 1.02 ratio         PTT - ( 2023 15:35 )  PTT:28.2 sec  Urinalysis Basic - ( 2023 16:35 )    Color: Light Yellow / Appearance: Clear / S.021 / pH: x  Gluc: x / Ketone: Negative  / Bili: Negative / Urobili: <2 mg/dL   Blood: x / Protein: Negative / Nitrite: Negative   Leuk Esterase: Large / RBC: 1 /HPF / WBC 12 /HPF   Sq Epi: x / Non Sq Epi: x / Bacteria: Negative                    PHYSICAL EXAM:  GEN: No acute distress  LUNGS: No resp distress noted on exam   HEART: Regular rate and rhythm  ABD: Soft, non-tender, non-distended.   EXT: No pedal edema  NEURO: AAOX3

## 2023-06-25 NOTE — CONSULT NOTE ADULT - SUBJECTIVE AND OBJECTIVE BOX
Gastroenterology Consultation:    Patient is a 83y old  Female who presents with a chief complaint of Lower GI bleed (25 Jun 2023 06:47)      Admitted on: 06-24-23  HPI:  83-year-old female presents emergency department with complaint with multiple episodes of diarrhea a/w  bright red blood.  GI was consulted for diarrhea associated with bright red blood per rectum.  Per patient 2 days ago after a eating food she started having like multiple episodes of loose stools happened within 1 hour duration followed by small episodes of bright red bleeding per rectum since yesterday in the morning she did not have like any bloody bowel movement currently her abdominal pain got better from time she complains of mucousy stools that has been going on for couple of denies any fecal urgency incontinence tenesmus any family history of inflammatory bowel disease.   Patient seen at urgent care prior to admission and had a CHAYO done which showed bright red blood per rectum, advised to go to ED for further evaluation.   Patient reported that brother has Colon cancer (does not recall at what age). Last colonoscopy done was years ago.      admits to history of hemorrhoids colonic polyps.  Last colonoscopy greater than 10 years ago.  no bleeding a this time     Prior EGD: None on chart   Prior Colonoscopy: >10 years ago as per patient       PAST MEDICAL & SURGICAL HISTORY:  Hypertension      Hypothyroidism      Bilateral cataracts      Obesity  BMI 35      [place] (hard of hearing)      Overactive bladder      S/P bladder repair      S/P hysterectomy      History of intraocular lens implant  RIGHT      S/P tonsillectomy and adenoidectomy      S/P colonoscopy  2005      Deviated septum  1980s      H/O cataract extraction  Bilateral          FAMILY HISTORY:  no family h/o GI malignancy     Social History:  Tobacco: N  Alcohol: n  Drugs: N    Home Medications:  amLODIPine 10 mg oral tablet: 1 tab(s) orally once a day (14 Oct 2022 11:32)  Synthroid 75 mcg (0.075 mg) oral tablet: 1 tab(s) orally once a day (14 Oct 2022 11:32)  terazosin: orally once a day (14 Oct 2022 11:32)    MEDICATIONS  (STANDING):  amLODIPine   Tablet 10 milliGRAM(s) Oral daily  aztreonam  IVPB 1000 milliGRAM(s) IV Intermittent every 12 hours  doxazosin 1 milliGRAM(s) Oral at bedtime  levothyroxine 75 MICROGram(s) Oral daily  metroNIDAZOLE  IVPB 500 milliGRAM(s) IV Intermittent every 8 hours  pantoprazole    Tablet 40 milliGRAM(s) Oral before breakfast    MEDICATIONS  (PRN):      Allergies  penicillin (Rash; Flushing; Hives)  metoprolol (Short breath)  losartan (Hives; Rash)  ciprofloxacin (Stomach Upset)  Diovan (Anaphylaxis)      Review of Systems:   Constitutional:  No Fever, No Chills  ENT/Mouth:  No Hearing Changes,  No Difficulty Swallowing  Eyes:  No Eye Pain, No Vision Changes  Cardiovascular:  No Chest Pain, No Palpitations  Respiratory:  No Cough, No Dyspnea  Gastrointestinal:  As described in HPI  Musculoskeletal:  No Joint Swelling, No Back Pain  Skin:  No Skin Lesions, No Jaundice  Neuro:  No Syncope, No Dizziness  Heme/Lymph:  No Bruising, No Bleeding.          Physical Examination:  T(C): 36.6 (06-25-23 @ 05:19), Max: 36.6 (06-25-23 @ 05:19)  HR: 72 (06-25-23 @ 14:06) (72 - 84)  BP: 162/69 (06-25-23 @ 14:06) (131/65 - 174/72)  RR: 18 (06-25-23 @ 14:06) (18 - 18)  SpO2: 97% (06-25-23 @ 14:06) (96% - 97%)        Constitutional: No acute distress.  Eyes:. Conjunctivae are clear, Sclera is non-icteric.  Ears Nose and Throat: The external ears are normal appearing,  Oral mucosa is pink and moist.  Respiratory:  No signs of respiratory distress. Lung sounds are clear bilaterally.  Cardiovascular:  S1 S2, Regular rate and rhythm.  GI: Abdomen is soft, symmetric, and non-tender without distention.           Data:                        11.6   6.79  )-----------( 177      ( 25 Jun 2023 01:19 )             35.9     Hgb Trend:  11.6  06-25-23 @ 01:19  13.3  06-24-23 @ 15:35      06-24    140  |  104  |  20  ----------------------------<  101<H>  4.4   |  23  |  0.8    Ca    9.6      24 Jun 2023 15:35    TPro  6.4  /  Alb  4.4  /  TBili  0.9  /  DBili  x   /  AST  20  /  ALT  17  /  AlkPhos  88  06-24    Liver panel trend:  TBili 0.9   /   AST 20   /   ALT 17   /   AlkP 88   /   Tptn 6.4   /   Alb 4.4    /   DBili --      06-24      PT/INR - ( 24 Jun 2023 15:35 )   PT: 11.60 sec;   INR: 1.02 ratio         PTT - ( 24 Jun 2023 15:35 )  PTT:28.2 sec        Radiology:

## 2023-06-25 NOTE — PROGRESS NOTE ADULT - ATTENDING COMMENTS
patient seen and examined independently of medical resident and agree with the note unless otherwise stated     Vital Signs Last 24 Hrs  T(C): 36.6 (25 Jun 2023 05:19), Max: 37 (24 Jun 2023 15:42)  T(F): 97.8 (25 Jun 2023 05:19), Max: 98.6 (24 Jun 2023 15:42)  HR: 84 (25 Jun 2023 05:19) (74 - 84)  BP: 131/65 (25 Jun 2023 05:19) (131/65 - 174/72)  BP(mean): 104 (24 Jun 2023 20:25) (104 - 104)  RR: 18 (25 Jun 2023 05:19) (18 - 18)  SpO2: 96% (24 Jun 2023 20:25) (96% - 98%)    Parameters below as of 24 Jun 2023 20:25  Patient On (Oxygen Delivery Method): room air                          11.6   6.79  )-----------( 177      ( 25 Jun 2023 01:19 )             35.9     06-24    140  |  104  |  20  ----------------------------<  101<H>  4.4   |  23  |  0.8    Ca    9.6      24 Jun 2023 15:35    TPro  6.4  /  Alb  4.4  /  TBili  0.9  /  DBili  x   /  AST  20  /  ALT  17  /  AlkPhos  88  06-24    # Sigmoid Colitis   # Lower GI bleed (resolved)   # HTN  # Hypothyroid     -currently on clear liquid diet - advance as tolerated  -labs stable   -GI consult placed on admission - follow reccs   -continue with home meds   -oob to chair as tolerated   -skin care as per nursing     DISPO: daily assessment for d/c planning  -spoke to patient about his plan of care     Attending Physician Dr. Vidhi Bustillo # [id number] patient seen and examined independently of medical resident and agree with the note unless otherwise stated     Vital Signs Last 24 Hrs  T(C): 36.6 (25 Jun 2023 05:19), Max: 37 (24 Jun 2023 15:42)  T(F): 97.8 (25 Jun 2023 05:19), Max: 98.6 (24 Jun 2023 15:42)  HR: 84 (25 Jun 2023 05:19) (74 - 84)  BP: 131/65 (25 Jun 2023 05:19) (131/65 - 174/72)  BP(mean): 104 (24 Jun 2023 20:25) (104 - 104)  RR: 18 (25 Jun 2023 05:19) (18 - 18)  SpO2: 96% (24 Jun 2023 20:25) (96% - 98%)    Parameters below as of 24 Jun 2023 20:25  Patient On (Oxygen Delivery Method): room air                          11.6   6.79  )-----------( 177      ( 25 Jun 2023 01:19 )             35.9     06-24    140  |  104  |  20  ----------------------------<  101<H>  4.4   |  23  |  0.8    Ca    9.6      24 Jun 2023 15:35    TPro  6.4  /  Alb  4.4  /  TBili  0.9  /  DBili  x   /  AST  20  /  ALT  17  /  AlkPhos  88  06-24    # Acute Sigmoid Colitis   # Lower GI bleed (resolved)   # HTN  # Hypothyroid     -currently on clear liquid diet - advance to regular   -labs stable - no bloody BM - limit labs unless absolutely needed - patient is [employer]   -GI consult placed on admission - follow reccs (curbside discussion with GI fellow this am - outpatient Colonoscopy 6-8 weeks)  -continue with home meds   -oob to chair as tolerated   -skin care as per nursing     DISPO: anticipate d/c home 6/26/23  -spoke to patient about her plan of care - updated daughter at bedside (aware of the current plan of care)    Attending Physician Dr. Vidhi Bustillo # [id number]

## 2023-06-26 ENCOUNTER — TRANSCRIPTION ENCOUNTER (OUTPATIENT)
Age: 83
End: 2023-06-26

## 2023-06-26 VITALS
DIASTOLIC BLOOD PRESSURE: 63 MMHG | TEMPERATURE: 97 F | RESPIRATION RATE: 18 BRPM | HEART RATE: 72 BPM | SYSTOLIC BLOOD PRESSURE: 140 MMHG

## 2023-06-26 LAB
CULTURE RESULTS: SIGNIFICANT CHANGE UP
GLUCOSE BLDC GLUCOMTR-MCNC: 139 MG/DL — HIGH (ref 70–99)
HCT VFR BLD CALC: 37.6 % — SIGNIFICANT CHANGE UP (ref 37–47)
HGB BLD-MCNC: 12.2 G/DL — SIGNIFICANT CHANGE UP (ref 12–16)
MCHC RBC-ENTMCNC: 30.3 PG — SIGNIFICANT CHANGE UP (ref 27–31)
MCHC RBC-ENTMCNC: 32.4 G/DL — SIGNIFICANT CHANGE UP (ref 32–37)
MCV RBC AUTO: 93.3 FL — SIGNIFICANT CHANGE UP (ref 81–99)
NRBC # BLD: 0 /100 WBCS — SIGNIFICANT CHANGE UP (ref 0–0)
PLATELET # BLD AUTO: 172 K/UL — SIGNIFICANT CHANGE UP (ref 130–400)
PMV BLD: 11.6 FL — HIGH (ref 7.4–10.4)
RBC # BLD: 4.03 M/UL — LOW (ref 4.2–5.4)
RBC # FLD: 13.3 % — SIGNIFICANT CHANGE UP (ref 11.5–14.5)
SPECIMEN SOURCE: SIGNIFICANT CHANGE UP
WBC # BLD: 6.1 K/UL — SIGNIFICANT CHANGE UP (ref 4.8–10.8)
WBC # FLD AUTO: 6.1 K/UL — SIGNIFICANT CHANGE UP (ref 4.8–10.8)

## 2023-06-26 PROCEDURE — 99239 HOSP IP/OBS DSCHRG MGMT >30: CPT

## 2023-06-26 RX ORDER — METRONIDAZOLE 500 MG
1 TABLET ORAL
Qty: 24 | Refills: 0
Start: 2023-06-26 | End: 2023-07-03

## 2023-06-26 RX ORDER — CEFPODOXIME PROXETIL 100 MG
1 TABLET ORAL
Qty: 16 | Refills: 0
Start: 2023-06-26 | End: 2023-07-03

## 2023-06-26 RX ADMIN — Medication 100 MILLIGRAM(S): at 09:00

## 2023-06-26 RX ADMIN — Medication 75 MICROGRAM(S): at 05:55

## 2023-06-26 RX ADMIN — AMLODIPINE BESYLATE 10 MILLIGRAM(S): 2.5 TABLET ORAL at 05:55

## 2023-06-26 RX ADMIN — PANTOPRAZOLE SODIUM 40 MILLIGRAM(S): 20 TABLET, DELAYED RELEASE ORAL at 07:11

## 2023-06-26 RX ADMIN — Medication 50 MILLIGRAM(S): at 05:57

## 2023-06-26 RX ADMIN — Medication 100 MILLIGRAM(S): at 02:15

## 2023-06-26 NOTE — DISCHARGE NOTE NURSING/CASE MANAGEMENT/SOCIAL WORK - PATIENT PORTAL LINK FT
You can access the FollowMyHealth Patient Portal offered by [facility] by registering at the following website: [email or web address]. By joining [facility]’s FollowMyHealth portal, you will also be able to view your health information using other applications (apps) compatible with our system.

## 2023-06-26 NOTE — DISCHARGE NOTE PROVIDER - NSDCMRMEDTOKEN_GEN_ALL_CORE_FT
acetaminophen 325 mg oral tablet: 2 tab(s) orally every 6 hours MDD:8  amLODIPine 10 mg oral tablet: 1 tab(s) orally once a day  cefpodoxime 200 mg oral tablet: 1 tab(s) orally 2 times a day  metroNIDAZOLE 500 mg oral tablet: 1 tab(s) orally 3 times a day  pantoprazole 40 mg oral delayed release tablet: 1 tab(s) orally once a day (before a meal) MDD:1  senna leaf extract oral tablet: 2 tab(s) orally once a day (at bedtime), As Needed -for constipation MDD:2   Synthroid 75 mcg (0.075 mg) oral tablet: 1 tab(s) orally once a day  terazosin: orally once a day

## 2023-06-26 NOTE — DISCHARGE NOTE PROVIDER - NSDCCPCAREPLAN_GEN_ALL_CORE_FT
PRINCIPAL DISCHARGE DIAGNOSIS  Diagnosis: Colitis  Assessment and Plan of Treatment: You have infection of your colon which is part of your intestines. We treated you with antibiotics and you are tolerating diet well. We are sending you home on antibiotics for 8 days. please return to the hospital if your pain worsens or you start vomiting and can not tolerate diet. You were seen by GI and they recommended for you to get a colonoscopy in 4-6 weeks. please see your private GI or contact ours.

## 2023-06-26 NOTE — DISCHARGE NOTE NURSING/CASE MANAGEMENT/SOCIAL WORK - NSDCPEFALRISK_GEN_ALL_CORE
For information on Fall & Injury Prevention, visit: [email or web address] OR  [email or web address] OR  https://www.cdc.gov/steadi/patient.html

## 2023-06-26 NOTE — DISCHARGE NOTE PROVIDER - NSDCFUSCHEDAPPT_GEN_ALL_CORE_FT
Daniel Dickinson  [facility] Physician [facility]  [address] [address]  Scheduled Appointment: 08/08/2023

## 2023-06-26 NOTE — DISCHARGE NOTE PROVIDER - CARE PROVIDER_API CALL
Chantal Bansal  Internal Medicine  [address] [place] [id number]  Phone: [phone]  Fax: [phone]  Follow Up Time:     Yadira Botello  Gastroenterology  [address] [place], [place] [address]  Phone: [phone]  Fax: [phone]  Follow Up Time:

## 2023-06-26 NOTE — DISCHARGE NOTE PROVIDER - HOSPITAL COURSE
83-year-old female presents emergency department with complaint with multiple episodes of diarrhea a/w  bright red blood.  Patient seen at urgent care prior to admission and had a CHAYO done which showed bright red blood per rectum, advised to go to ED for further evaluation.  Patient denies AC use, nausea, vomiting, fever, chills, chest pain, shortness of breath, lightheadedness, dizziness. Patient reported that brother has Colon cancer (does not recall at what age). Last colonoscopy done was years ago.    admits to history of hemorrhoids colonic polyps.  Last colonoscopy greater than 10 years ago.  no bleeding a this time     In the hospital the patient was treated for the following conditions:    #Colitis   #Diarrhea  #Lower GI bleed   *CT abd/ pelvis showed colon thickening consistent with colitis  *Hg within normal limits 13.3  *wbc normal 8.22  -s/p metronidazole and azectam, DC with vantin and metronidazole   -patient refused ciprofloxacin because it caused diarrhea the last time she took it, she did not have any sign of allergy from ciprofloxacin  -GI consult appreciated, colonoscopy outpatient in 4-6 weeks    #HTN - uncontrolled  -c/w home amlodipine 10mg QD  -c/w home terazosin (exchanged for doxazosin)  - outpt f/u for management    #Hypothyroidism  -c/w home Levothyroxine 75mcg    Pt seen today and stable for DC

## 2023-06-27 RX ORDER — PANTOPRAZOLE SODIUM 20 MG/1
1 TABLET, DELAYED RELEASE ORAL
Qty: 30 | Refills: 0
Start: 2023-06-27 | End: 2023-07-26

## 2023-06-29 DIAGNOSIS — E66.9 OBESITY, UNSPECIFIED: ICD-10-CM

## 2023-06-29 DIAGNOSIS — H91.90 UNSPECIFIED HEARING LOSS, UNSPECIFIED EAR: ICD-10-CM

## 2023-06-29 DIAGNOSIS — K64.9 UNSPECIFIED HEMORRHOIDS: ICD-10-CM

## 2023-06-29 DIAGNOSIS — Z79.899 OTHER LONG TERM (CURRENT) DRUG THERAPY: ICD-10-CM

## 2023-06-29 DIAGNOSIS — E03.9 HYPOTHYROIDISM, UNSPECIFIED: ICD-10-CM

## 2023-06-29 DIAGNOSIS — Z88.0 ALLERGY STATUS TO PENICILLIN: ICD-10-CM

## 2023-06-29 DIAGNOSIS — Z79.890 HORMONE REPLACEMENT THERAPY: ICD-10-CM

## 2023-06-29 DIAGNOSIS — K52.89 OTHER SPECIFIED NONINFECTIVE GASTROENTERITIS AND COLITIS: ICD-10-CM

## 2023-06-29 DIAGNOSIS — I10 ESSENTIAL (PRIMARY) HYPERTENSION: ICD-10-CM

## 2023-06-29 DIAGNOSIS — Z79.82 LONG TERM (CURRENT) USE OF ASPIRIN: ICD-10-CM

## 2023-06-29 DIAGNOSIS — H26.9 UNSPECIFIED CATARACT: ICD-10-CM

## 2023-06-29 DIAGNOSIS — Z88.8 ALLERGY STATUS TO OTHER DRUGS, MEDICAMENTS AND BIOLOGICAL SUBSTANCES: ICD-10-CM

## 2023-08-08 ENCOUNTER — APPOINTMENT (OUTPATIENT)
Dept: ORTHOPEDIC SURGERY | Facility: CLINIC | Age: 83
End: 2023-08-08
Payer: MEDICARE

## 2023-08-08 PROCEDURE — 99213 OFFICE O/P EST LOW 20 MIN: CPT

## 2023-08-08 PROCEDURE — 72170 X-RAY EXAM OF PELVIS: CPT

## 2023-08-08 PROCEDURE — 73560 X-RAY EXAM OF KNEE 1 OR 2: CPT | Mod: 50

## 2023-08-08 NOTE — HISTORY OF PRESENT ILLNESS
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 AP x-ray of the pelvis is negative for acute bone or soft tissue trauma.   Imaging: X-rays were reviewed with the patient.   AP and Lateral views were obtained of the knees, including the contralateral side for comparison purposes. X-rays are negative for acute bone or soft tissue trauma.

## 2023-08-15 ENCOUNTER — APPOINTMENT (OUTPATIENT)
Dept: ORTHOPEDIC SURGERY | Facility: CLINIC | Age: 83
End: 2023-08-15

## 2023-09-29 ENCOUNTER — APPOINTMENT (OUTPATIENT)
Dept: ORTHOPEDIC SURGERY | Facility: CLINIC | Age: 83
End: 2023-09-29
Payer: MEDICARE

## 2023-09-29 DIAGNOSIS — Z96.651 PRESENCE OF RIGHT ARTIFICIAL KNEE JOINT: ICD-10-CM

## 2023-09-29 PROCEDURE — 99213 OFFICE O/P EST LOW 20 MIN: CPT

## 2023-10-04 NOTE — ASU PATIENT PROFILE, ADULT - CAREGIVER
Addended by: BEENA GARCIA on: 10/4/2023 10:05 AM     Modules accepted: Orders    
Addended by: JOCELYN FUENTES SR. on: 10/4/2023 12:44 PM     Modules accepted: Orders    
No

## 2023-10-24 NOTE — ASU PATIENT PROFILE, ADULT - PRO ARRIVE FROM
SICU PLAN OF CARE NOTE    Dx: Acute on chronic combined systolic and diastolic congestive heart failure    Goals of Care: diuresis, line placement for medications    Vital Signs (last 12 hours):   Temp:  [97.4 °F (36.3 °C)-98.5 °F (36.9 °C)]   Pulse:  [63-83]   Resp:  [17-31]   BP: ()/(44-65)   SpO2:  [94 %-98 %]      Neuro: AAO x4, Follows Commands, and Moves All Extremities    Cardiac: NSR 60s-70s    Respiratory: Room Air    Gtts: Norepinephrine, Lasix, and Heparin    Urine Output: Urinary Catheter 635 cc/shift    Drains: none    Diet: Cardiac Diet     Labs/Accuchecks: daily/achs    Skin: no new breakdown, specialty bed in use, pt able to shift weight independently    Shift Events: 100mg IVP lasix, lasix gtt increased to 20mg/hr, 250mg IVPB Diuril       home

## 2023-10-31 ENCOUNTER — APPOINTMENT (OUTPATIENT)
Dept: PODIATRY | Facility: CLINIC | Age: 83
End: 2023-10-31
Payer: MEDICARE

## 2023-10-31 VITALS
WEIGHT: 168 LBS | HEART RATE: 79 BPM | BODY MASS INDEX: 31.72 KG/M2 | HEIGHT: 61 IN | TEMPERATURE: 97 F | OXYGEN SATURATION: 98 %

## 2023-10-31 PROCEDURE — 99203 OFFICE O/P NEW LOW 30 MIN: CPT

## 2023-10-31 RX ORDER — CELECOXIB 200 MG/1
200 CAPSULE ORAL
Qty: 28 | Refills: 0 | Status: COMPLETED | COMMUNITY
Start: 2022-10-15 | End: 2022-10-31

## 2023-11-09 NOTE — ASU PATIENT PROFILE, ADULT - DATE OF LAST VACCINATION
06-Oct-2021

## 2023-12-08 NOTE — PRE-ANESTHESIA EVALUATION ADULT - HEART RATE (BEATS/MIN)
Spoke with Mr Adonis Ramila, voiced understanding of lab finding and information from the provider, no other concerns voiced @ this time    77

## 2023-12-11 ENCOUNTER — OUTPATIENT (OUTPATIENT)
Dept: OUTPATIENT SERVICES | Facility: HOSPITAL | Age: 83
LOS: 1 days | End: 2023-12-11
Payer: MEDICARE

## 2023-12-11 ENCOUNTER — APPOINTMENT (OUTPATIENT)
Dept: PODIATRY | Facility: CLINIC | Age: 83
End: 2023-12-11
Payer: MEDICARE

## 2023-12-11 DIAGNOSIS — Z98.890 OTHER SPECIFIED POSTPROCEDURAL STATES: Chronic | ICD-10-CM

## 2023-12-11 DIAGNOSIS — Z00.00 ENCOUNTER FOR GENERAL ADULT MEDICAL EXAMINATION WITHOUT ABNORMAL FINDINGS: ICD-10-CM

## 2023-12-11 DIAGNOSIS — Z90.710 ACQUIRED ABSENCE OF BOTH CERVIX AND UTERUS: Chronic | ICD-10-CM

## 2023-12-11 DIAGNOSIS — Z90.89 ACQUIRED ABSENCE OF OTHER ORGANS: Chronic | ICD-10-CM

## 2023-12-11 DIAGNOSIS — Z96.1 PRESENCE OF INTRAOCULAR LENS: Chronic | ICD-10-CM

## 2023-12-11 DIAGNOSIS — Z98.49 CATARACT EXTRACTION STATUS, UNSPECIFIED EYE: Chronic | ICD-10-CM
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
## 2023-12-11 DIAGNOSIS — J34.2 DEVIATED NASAL SEPTUM: Chronic | ICD-10-CM

## 2023-12-11 DIAGNOSIS — L60.0 INGROWING NAIL: ICD-10-CM
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
## 2023-12-11 DIAGNOSIS — M79.671 PAIN IN RIGHT FOOT: ICD-10-CM

## 2023-12-11 PROCEDURE — 99213 OFFICE O/P EST LOW 20 MIN: CPT

## 2023-12-11 PROCEDURE — 99203 OFFICE O/P NEW LOW 30 MIN: CPT

## 2023-12-12 ENCOUNTER — APPOINTMENT (OUTPATIENT)
Dept: PODIATRY | Facility: CLINIC | Age: 83
End: 2023-12-12

## 2023-12-26 NOTE — PHYSICAL EXAM
[Ankle Swelling (On Exam)] : not present [Varicose Veins Of Lower Extremities] : not present [] : not present [Delayed in the Right Toes] : capillary refills normal in right toes [Delayed in the Left Toes] : capillary refills normal in the left toes [Foot Ulcer] : no foot ulcer [Skin Induration] : no skin induration

## 2023-12-26 NOTE — ASSESSMENT
[FreeTextEntry1] :  Dx: ingrown toenail Right foot 1st toe c callous partial Dbx 1st toenail Right foot today - well tolerated  Plan: 1.Pt to monitor and report changes  2. f/u in 3 months or sooner if any new issues

## 2024-01-09 ENCOUNTER — APPOINTMENT (OUTPATIENT)
Dept: PODIATRY | Facility: CLINIC | Age: 84
End: 2024-01-09

## 2024-03-07 ENCOUNTER — APPOINTMENT (OUTPATIENT)
Dept: ORTHOPEDIC SURGERY | Facility: CLINIC | Age: 84
End: 2024-03-07

## 2024-04-29 ENCOUNTER — OUTPATIENT (OUTPATIENT)
Dept: OUTPATIENT SERVICES | Facility: HOSPITAL | Age: 84
LOS: 1 days | End: 2024-04-29
Payer: MEDICARE

## 2024-04-29 DIAGNOSIS — Z96.1 PRESENCE OF INTRAOCULAR LENS: Chronic | ICD-10-CM

## 2024-04-29 DIAGNOSIS — Z90.89 ACQUIRED ABSENCE OF OTHER ORGANS: Chronic | ICD-10-CM

## 2024-04-29 DIAGNOSIS — Z98.890 OTHER SPECIFIED POSTPROCEDURAL STATES: Chronic | ICD-10-CM

## 2024-04-29 DIAGNOSIS — Z00.8 ENCOUNTER FOR OTHER GENERAL EXAMINATION: ICD-10-CM

## 2024-04-29 DIAGNOSIS — Z96.651 PRESENCE OF RIGHT ARTIFICIAL KNEE JOINT: ICD-10-CM

## 2024-04-29 DIAGNOSIS — Z98.49 CATARACT EXTRACTION STATUS, UNSPECIFIED EYE: Chronic | ICD-10-CM

## 2024-04-29 DIAGNOSIS — J34.2 DEVIATED NASAL SEPTUM: Chronic | ICD-10-CM

## 2024-04-29 DIAGNOSIS — Z90.710 ACQUIRED ABSENCE OF BOTH CERVIX AND UTERUS: Chronic | ICD-10-CM

## 2024-04-29 PROCEDURE — 78803 RP LOCLZJ TUM SPECT 1 AREA: CPT

## 2024-04-29 PROCEDURE — 78803 RP LOCLZJ TUM SPECT 1 AREA: CPT | Mod: 26

## 2024-04-29 PROCEDURE — 78315 BONE IMAGING 3 PHASE: CPT | Mod: XU

## 2024-04-29 PROCEDURE — A9503: CPT

## 2024-04-29 PROCEDURE — 78315 BONE IMAGING 3 PHASE: CPT | Mod: 26,59

## 2024-04-30 DIAGNOSIS — Z96.651 PRESENCE OF RIGHT ARTIFICIAL KNEE JOINT: ICD-10-CM

## 2024-06-17 NOTE — ASU PATIENT PROFILE, ADULT - CENTRAL VENOUS CATHETER
Called and Kelby said that Carey was sleeping.  He said that he tried to get a hold of the cancer navigator but she is on vacation today.  He then told me that Carey has not been able to stand on her own the past 3 days.  He had been able to get her out of bed to a wheelchair and then to bathroom but last night when he was trying to pull her pants up she collapsed.  He needed to contact family to come over and help her up and to bed.    Discussed with Breann and Breann said that Carey needs to go to ER.  Kelby informed and agreed but he will not be able to get her out of the house so I did instruction him to call the ambulance.   He verbalized understanding.   He will call if he needs anything else.       no

## 2024-08-11 NOTE — PATIENT PROFILE ADULT - TRANSPORTATION
Patient mobility status  with no difficulty. Provider aware     I have reviewed discharge instructions with the patient and spouse.  The patient and spouse verbalized understanding.    Patient left ED via Discharge Method: ambulatory to Home with Spouse and Significant Other.    Opportunity for questions and clarification provided.     Patient given 1 scripts.        
no